# Patient Record
Sex: MALE | Race: BLACK OR AFRICAN AMERICAN | NOT HISPANIC OR LATINO | Employment: FULL TIME | URBAN - METROPOLITAN AREA
[De-identification: names, ages, dates, MRNs, and addresses within clinical notes are randomized per-mention and may not be internally consistent; named-entity substitution may affect disease eponyms.]

---

## 2017-09-29 ENCOUNTER — OFFICE VISIT (OUTPATIENT)
Dept: FAMILY MEDICINE | Facility: CLINIC | Age: 42
End: 2017-09-29
Payer: COMMERCIAL

## 2017-09-29 VITALS
WEIGHT: 270.63 LBS | DIASTOLIC BLOOD PRESSURE: 86 MMHG | TEMPERATURE: 99 F | SYSTOLIC BLOOD PRESSURE: 138 MMHG | HEART RATE: 97 BPM | BODY MASS INDEX: 42.48 KG/M2 | HEIGHT: 67 IN | OXYGEN SATURATION: 97 %

## 2017-09-29 DIAGNOSIS — L40.9 PSORIASIS: ICD-10-CM

## 2017-09-29 DIAGNOSIS — N40.0 ENLARGED PROSTATE: ICD-10-CM

## 2017-09-29 DIAGNOSIS — N45.1 EPIDIDYMITIS: Primary | ICD-10-CM

## 2017-09-29 PROCEDURE — 3008F BODY MASS INDEX DOCD: CPT | Mod: S$GLB,,, | Performed by: NURSE PRACTITIONER

## 2017-09-29 PROCEDURE — 99213 OFFICE O/P EST LOW 20 MIN: CPT | Mod: S$GLB,,, | Performed by: NURSE PRACTITIONER

## 2017-09-29 RX ORDER — FLUOCINONIDE 0.5 MG/G
OINTMENT TOPICAL 2 TIMES DAILY
Qty: 60 G | Refills: 0 | Status: SHIPPED | OUTPATIENT
Start: 2017-09-29 | End: 2017-11-01

## 2017-09-29 RX ORDER — CIPROFLOXACIN 500 MG/1
500 TABLET ORAL 2 TIMES DAILY
Qty: 60 TABLET | Refills: 0 | Status: SHIPPED | OUTPATIENT
Start: 2017-09-29 | End: 2017-10-09

## 2017-09-29 NOTE — PATIENT INSTRUCTIONS
Epididymitis  Inflammation of the epididymis can cause pain and swelling in your scrotum. The epididymis is a small tube next to the testicle that stores sperm. Epididymitis is usually caused by an infection. In sexually active men, it is often caused by a sexually transmitted disease (STD) such as chlamydia or gonorrhea. In boys and in men over 40, it can be from bacteria from other parts of the urinary tract (not an STD infection).  Symptoms may begin with pain in the lower belly (abdomen) or low back. The pain then spreads down into the scrotum. Usually only one side is affected. The testicle and scrotum swell and become very painful and red. You may have fever and a burning when passing urine. Sometimes you may have a discharge from the penis.  Treatment is with antibiotics, and anti-inflammatory and pain medicines. The condition should get better over the first few days of treatment. But it will take several weeks for all the swelling and discomfort to go away. If your healthcare provider suspects that an STD is the cause, your sexual partners may need to be treated.  Home care  The following will help you care for yourself at home:  · Support the scrotum. When lying down, place a rolled towel under the scrotum. When walking, use an athletic supporter or 2 pairs of jockey-style underwear.  · To relieve pain, put ice packs on the inflamed area. You can make your own ice pack by putting ice cubes in a sealed plastic bag wrapped in a thin towel.  · You may use over-the-counter medicines to control pain, unless another medicine was given. If you have chronic liver or kidney disease, talk with your healthcare provider before taking these medicines. Also talk with your provider if you've ever had a stomach ulcer or GI bleeding.  · Rest in bed for the first few days until the fever, pain, and swelling get better. It may take several weeks for all of the swelling to go away.  · Constipation can make you strain. This  makes the pain worse. Avoid constipation by eating natural laxatives such as prunes, fresh fruits, and whole-grain cereals. If necessary, use a mild over-the-counter laxative for constipation. Mineral oil can be used to keep the stools soft.  · Do not have sex until you have finished all treatment and all symptoms have cleared.  · Take all medicine as directed. Do not miss any doses and do not stop early even if you feel better.  Follow-up care  Follow up with your healthcare provider, or as advised, to be sure you are responding properly to treatment. If a culture was taken, you may call for the result as directed. A culture test can ensure that you are on the correct antibiotic.   When to seek medical advice  Call your healthcare provider right away if any of these occur:  · Fever of 100.4ºF (38ºC), or as directed by your healthcare provider  · Increasing pain or swelling of the testicle after starting treatment  · Pressure or pain in your bladder that gets worse  · Unable to pass urine for 8 hours  Date Last Reviewed: 9/1/2016  © 0678-3951 [employer]. [address] [place] [address], [place] [address]. All rights reserved. This information is not intended as a substitute for professional medical care. Always follow your healthcare professional's instructions.

## 2017-11-01 ENCOUNTER — OFFICE VISIT (OUTPATIENT)
Dept: FAMILY MEDICINE | Facility: CLINIC | Age: 42
End: 2017-11-01
Payer: COMMERCIAL

## 2017-11-01 VITALS
OXYGEN SATURATION: 98 % | WEIGHT: 270.38 LBS | BODY MASS INDEX: 42.44 KG/M2 | HEART RATE: 93 BPM | TEMPERATURE: 98 F | DIASTOLIC BLOOD PRESSURE: 92 MMHG | SYSTOLIC BLOOD PRESSURE: 138 MMHG | HEIGHT: 67 IN

## 2017-11-01 DIAGNOSIS — R68.89 FLU-LIKE SYMPTOMS: Primary | ICD-10-CM

## 2017-11-01 DIAGNOSIS — B34.9 VIRAL ILLNESS: ICD-10-CM

## 2017-11-01 LAB
CTP QC/QA: YES
FLUAV AG NPH QL: NEGATIVE
FLUBV AG NPH QL: NEGATIVE

## 2017-11-01 PROCEDURE — 99213 OFFICE O/P EST LOW 20 MIN: CPT | Mod: S$GLB,,, | Performed by: NURSE PRACTITIONER

## 2017-11-01 PROCEDURE — 87804 INFLUENZA ASSAY W/OPTIC: CPT | Mod: ,,, | Performed by: FAMILY MEDICINE

## 2017-11-01 NOTE — PROGRESS NOTES
Subjective:       Patient ID: Manjit Ramirez is a 42 y.o. male.    Chief Complaint: Generalized Body Aches    Sinus Problem   This is a new problem. The current episode started yesterday (wakes up with congestion). The problem is unchanged. There has been no fever. He is experiencing no pain. Pertinent negatives include no chills, congestion, coughing, diaphoresis, ear pain, headaches, hoarse voice, neck pain, shortness of breath, sinus pressure, sneezing, sore throat or swollen glands. Past treatments include acetaminophen.     Review of Systems   Constitutional: Negative for chills, diaphoresis, fatigue and fever.   HENT: Negative for congestion, ear pain, hoarse voice, postnasal drip, sinus pain, sinus pressure, sneezing and sore throat.         Only has congestion in the morning     Eyes: Negative for photophobia and visual disturbance.   Respiratory: Negative for cough, chest tightness, shortness of breath and wheezing.    Musculoskeletal: Positive for myalgias. Negative for neck pain.   Neurological: Negative for headaches.       Objective:      Physical Exam   Constitutional: He is oriented to person, place, and time. He appears well-developed and well-nourished. No distress.   HENT:   Right Ear: Tympanic membrane and external ear normal.   Left Ear: Tympanic membrane and external ear normal.   Nose: No mucosal edema or rhinorrhea. Right sinus exhibits no maxillary sinus tenderness and no frontal sinus tenderness. Left sinus exhibits no maxillary sinus tenderness and no frontal sinus tenderness.   Mouth/Throat: No oropharyngeal exudate, posterior oropharyngeal edema or posterior oropharyngeal erythema.   Cardiovascular: Normal rate, regular rhythm and normal heart sounds.    Pulmonary/Chest: Effort normal and breath sounds normal. No respiratory distress. He has no wheezes.   Neurological: He is alert and oriented to person, place, and time.   Skin: Skin is warm and dry. He is not diaphoretic.   Psychiatric:  He has a normal mood and affect. His behavior is normal. Judgment and thought content normal.   Vitals reviewed.      Assessment:       1. Flu-like symptoms    2. Viral illness        Plan:       Flu-like symptoms  -     POCT Influenza A/B    Viral illness    symptom management  Rest hydrate

## 2017-12-04 ENCOUNTER — OFFICE VISIT (OUTPATIENT)
Dept: FAMILY MEDICINE | Facility: CLINIC | Age: 42
End: 2017-12-04
Payer: COMMERCIAL

## 2017-12-04 VITALS
TEMPERATURE: 98 F | HEART RATE: 98 BPM | OXYGEN SATURATION: 98 % | BODY MASS INDEX: 42.16 KG/M2 | WEIGHT: 269.19 LBS | RESPIRATION RATE: 15 BRPM | SYSTOLIC BLOOD PRESSURE: 137 MMHG | DIASTOLIC BLOOD PRESSURE: 89 MMHG

## 2017-12-04 DIAGNOSIS — I10 ESSENTIAL HYPERTENSION: Primary | ICD-10-CM

## 2017-12-04 PROCEDURE — 99213 OFFICE O/P EST LOW 20 MIN: CPT | Mod: S$GLB,,, | Performed by: NURSE PRACTITIONER

## 2017-12-04 RX ORDER — AMLODIPINE BESYLATE 5 MG/1
5 TABLET ORAL DAILY
Qty: 30 TABLET | Refills: 0 | Status: SHIPPED | OUTPATIENT
Start: 2017-12-04 | End: 2017-12-20 | Stop reason: SDUPTHER

## 2017-12-04 NOTE — PROGRESS NOTES
Subjective:       Patient ID: Manjit Ramirez is a 42 y.o. male.    Chief Complaint: Hypertension    Hypertension   This is a new (patient went for [employer] physy[employer] today pressure was 164/100 they would not pass him) problem. The problem is unchanged. The problem is uncontrolled. Associated symptoms include headaches. Pertinent negatives include no anxiety, blurred vision, chest pain, malaise/fatigue, neck pain, orthopnea, palpitations, peripheral edema, PND, shortness of breath or sweats. Risk factors for coronary artery disease include male gender, obesity and sedentary lifestyle. Past treatments include nothing. There is no history of renovascular disease or a thyroid problem. There is no history of chronic renal disease, coarctation of the aorta, hyperaldosteronism, hypercortisolism, hyperparathyroidism, a hypertension causing med, pheochromocytoma or sleep apnea.     Review of Systems   Constitutional: Negative for chills, diaphoresis, fatigue, fever and malaise/fatigue.   Eyes: Negative for blurred vision, photophobia and visual disturbance.   Respiratory: Negative for chest tightness, shortness of breath and wheezing.    Cardiovascular: Negative for chest pain, palpitations, orthopnea, leg swelling and PND.   Musculoskeletal: Negative for neck pain.   Neurological: Positive for headaches. Negative for dizziness.       Objective:      Physical Exam   Constitutional: He is oriented to person, place, and time. He appears well-developed and well-nourished. No distress.   HENT:   Right Ear: External ear normal.   Left Ear: External ear normal.   Cardiovascular: Normal rate, regular rhythm and normal heart sounds.    Pulmonary/Chest: Effort normal and breath sounds normal.   Neurological: He is alert and oriented to person, place, and time.   Skin: Skin is warm and dry. He is not diaphoretic.   Psychiatric: He has a normal mood and affect. His behavior is normal. Judgment and thought content normal.   Vitals  reviewed.      Assessment:       1. Essential hypertension        Plan:       Essential hypertension  -     amLODIPine (NORVASC) 5 MG tablet; Take 1 tablet (5 mg total) by mouth once daily.  Dispense: 30 tablet; Refill: 0      Monitor pressure at home if higher the 140/90 increase medicine to 10mg  Decrease salt intake  Daily exercise

## 2017-12-08 ENCOUNTER — TELEPHONE (OUTPATIENT)
Dept: FAMILY MEDICINE | Facility: CLINIC | Age: 42
End: 2017-12-08

## 2017-12-08 NOTE — TELEPHONE ENCOUNTER
----- Message from Genie Kraus sent at 12/8/2017 11:51 AM CST -----  Patient says blood pressure still running high. Do you want to adjust dosage and/or change medication? Readings 146/92 (bottom nbr 90s) (top nbr 140-160)     Aruna

## 2017-12-20 ENCOUNTER — OFFICE VISIT (OUTPATIENT)
Dept: FAMILY MEDICINE | Facility: CLINIC | Age: 42
End: 2017-12-20
Payer: COMMERCIAL

## 2017-12-20 VITALS
OXYGEN SATURATION: 98 % | DIASTOLIC BLOOD PRESSURE: 98 MMHG | HEIGHT: 67 IN | BODY MASS INDEX: 41.87 KG/M2 | WEIGHT: 266.75 LBS | SYSTOLIC BLOOD PRESSURE: 152 MMHG | HEART RATE: 84 BPM | TEMPERATURE: 98 F

## 2017-12-20 DIAGNOSIS — I10 ESSENTIAL HYPERTENSION: ICD-10-CM

## 2017-12-20 DIAGNOSIS — Z00.00 ROUTINE HEALTH MAINTENANCE: Primary | ICD-10-CM

## 2017-12-20 PROCEDURE — 99396 PREV VISIT EST AGE 40-64: CPT | Mod: S$GLB,,, | Performed by: FAMILY MEDICINE

## 2017-12-20 RX ORDER — AMLODIPINE BESYLATE 10 MG/1
10 TABLET ORAL DAILY
Qty: 90 TABLET | Refills: 1 | Status: SHIPPED | OUTPATIENT
Start: 2017-12-20 | End: 2018-10-10 | Stop reason: SDUPTHER

## 2017-12-21 ENCOUNTER — LAB VISIT (OUTPATIENT)
Dept: LAB | Facility: HOSPITAL | Age: 42
End: 2017-12-21
Attending: FAMILY MEDICINE
Payer: COMMERCIAL

## 2017-12-21 DIAGNOSIS — I10 ESSENTIAL HYPERTENSION: ICD-10-CM

## 2017-12-21 DIAGNOSIS — Z00.00 ROUTINE HEALTH MAINTENANCE: ICD-10-CM

## 2017-12-21 LAB
ALBUMIN SERPL BCP-MCNC: 4.4 G/DL
ALP SERPL-CCNC: 79 U/L
ALT SERPL W/O P-5'-P-CCNC: 44 U/L
ANION GAP SERPL CALC-SCNC: 11 MMOL/L
AST SERPL-CCNC: 22 U/L
BASOPHILS # BLD AUTO: 0.04 K/UL
BASOPHILS NFR BLD: 0.6 %
BILIRUB SERPL-MCNC: 0.5 MG/DL
BUN SERPL-MCNC: 11 MG/DL
CALCIUM SERPL-MCNC: 9.1 MG/DL
CHLORIDE SERPL-SCNC: 101 MMOL/L
CHOLEST SERPL-MCNC: 211 MG/DL
CHOLEST/HDLC SERPL: 5.4 {RATIO}
CO2 SERPL-SCNC: 30 MMOL/L
CREAT SERPL-MCNC: 1.06 MG/DL
DIFFERENTIAL METHOD: NORMAL
EOSINOPHIL # BLD AUTO: 0.1 K/UL
EOSINOPHIL NFR BLD: 2.2 %
ERYTHROCYTE [DISTWIDTH] IN BLOOD BY AUTOMATED COUNT: 13.8 %
EST. GFR  (AFRICAN AMERICAN): >60 ML/MIN/1.73 M^2
EST. GFR  (NON AFRICAN AMERICAN): >60 ML/MIN/1.73 M^2
GLUCOSE SERPL-MCNC: 79 MG/DL
HCT VFR BLD AUTO: 44.6 %
HDLC SERPL-MCNC: 39 MG/DL
HDLC SERPL: 18.5 %
HGB BLD-MCNC: 14.8 G/DL
LDLC SERPL CALC-MCNC: 137.4 MG/DL
LYMPHOCYTES # BLD AUTO: 2.6 K/UL
LYMPHOCYTES NFR BLD: 40.6 %
MCH RBC QN AUTO: 28.8 PG
MCHC RBC AUTO-ENTMCNC: 33.2 G/DL
MCV RBC AUTO: 87 FL
MONOCYTES # BLD AUTO: 0.5 K/UL
MONOCYTES NFR BLD: 7.8 %
NEUTROPHILS # BLD AUTO: 3.2 K/UL
NEUTROPHILS NFR BLD: 48.8 %
NONHDLC SERPL-MCNC: 172 MG/DL
PLATELET # BLD AUTO: 235 K/UL
PMV BLD AUTO: 11.6 FL
POTASSIUM SERPL-SCNC: 4.1 MMOL/L
PROT SERPL-MCNC: 7.8 G/DL
RBC # BLD AUTO: 5.14 M/UL
SODIUM SERPL-SCNC: 142 MMOL/L
TRIGL SERPL-MCNC: 173 MG/DL
TSH SERPL DL<=0.005 MIU/L-ACNC: 0.99 UIU/ML
WBC # BLD AUTO: 6.5 K/UL

## 2017-12-21 PROCEDURE — 36415 COLL VENOUS BLD VENIPUNCTURE: CPT | Mod: PO

## 2017-12-21 PROCEDURE — 84443 ASSAY THYROID STIM HORMONE: CPT | Mod: PO

## 2017-12-21 PROCEDURE — 85025 COMPLETE CBC W/AUTO DIFF WBC: CPT | Mod: PO

## 2017-12-21 PROCEDURE — 80053 COMPREHEN METABOLIC PANEL: CPT | Mod: PO

## 2017-12-21 PROCEDURE — 80061 LIPID PANEL: CPT

## 2017-12-22 ENCOUNTER — TELEPHONE (OUTPATIENT)
Dept: FAMILY MEDICINE | Facility: CLINIC | Age: 42
End: 2017-12-22

## 2017-12-22 NOTE — TELEPHONE ENCOUNTER
----- Message from Manjit Alas MD sent at 12/21/2017 10:25 PM CST -----  Results are good  Your cholesterol is little high but no drastic changes.  Keep fats to a minimum.  Try to loose a little wt like 10 lbs over 2 mo and lets recheck in 3 mo    UA normal        Pt notified and advised that results are on his my [facility] if he wants to review them

## 2017-12-29 NOTE — PROGRESS NOTES
Patient ID: Manjit Ramirez is a 42 y.o. male.    Chief Complaint: Follow-up (blood pressure) and Blurred Vision    HPI      Manjit Ramirez is a 42 y.o. male. here for annual exam.   No current complaints.  Blurred vision when not connor glasses.  Just had an eye exam within 2 mo.        Review of Symptoms    Constitutional: Negative.    HENT: Negative.    Eyes: Negative.    Respiratory: Negative.    Cardiovascular: Negative.    Gastrointestinal: Negative.    Endocrine: Negative.    Genitourinary: Negative.    Musculoskeletal: Negative.    Skin: Negative.    Allergic/Immunologic: Negative.    Neurological: Negative.    Hematological: Negative.    Psychiatric/Behavioral: Negative.      Except as above in HPI        Physical  Exam    Constitutional:  Oriented to person, place, and time. Appears well-developed and well-nourished.     HENT:   Head: Normocephalic and atraumatic.     Right Ear: Tympanic membrane, external ear and ear canal normal.     Left Ear: Tympanic membrane, external ear and ear canal normal.     Nose: Nose normal. No rhinorrhea or nasal deformity.     Mouth/Throat: Uvula is midline, oropharynx is clear and moist and mucous membranes are normal.      Eyes: Conjunctivae are normal. Right eye exhibits no discharge. Left eye exhibits no discharge. No scleral icterus.     Neck:  No JVD present. No tracheal deviation  [x]  Neck supple.   []  No Carotid bruit    Cardiovascular: Normal rate, regular rhythm and normal heart sounds.      Pulmonary/Chest: Effort normal and breath sounds normal. No stridor. No respiratory distress. No wheezes. No rales.      Musculoskeletal: Normal range of motion. No edema or tenderness.   No deformity     Lymphadenopathy:  No cervical adenopathy.     Neurological:  Alert and oriented to person, place, and time. Coordination normal.     Skin: Skin is warm and dry. No rash noted.     Psychiatric: Normal mood and affect. Speech is normal and behavior is normal. Judgment and  thought content normal.     Complete Blood Count  Lab Results   Component Value Date    RBC 5.14 12/21/2017    HGB 14.8 12/21/2017    HCT 44.6 12/21/2017    MCV 87 12/21/2017    MCH 28.8 12/21/2017    MCHC 33.2 12/21/2017    RDW 13.8 12/21/2017     12/21/2017    MPV 11.6 12/21/2017    GRAN 3.2 12/21/2017    GRAN 48.8 12/21/2017    LYMPH 2.6 12/21/2017    LYMPH 40.6 12/21/2017    MONO 0.5 12/21/2017    MONO 7.8 12/21/2017    EOS 0.1 12/21/2017    BASO 0.04 12/21/2017    EOSINOPHIL 2.2 12/21/2017    BASOPHIL 0.6 12/21/2017    DIFFMETHOD Automated 12/21/2017       Comprehensive Metabolic Panel  Lab Results   Component Value Date    GLU 79 12/21/2017    BUN 11 12/21/2017    CREATININE 1.06 12/21/2017     12/21/2017    K 4.1 12/21/2017     12/21/2017    PROT 7.8 12/21/2017    ALBUMIN 4.4 12/21/2017    BILITOT 0.5 12/21/2017    AST 22 12/21/2017    ALKPHOS 79 12/21/2017    CO2 30 (H) 12/21/2017    ALT 44 12/21/2017    ANIONGAP 11 12/21/2017    EGFRNONAA >60.0 12/21/2017    ESTGFRAFRICA >60.0 12/21/2017       TSH  Lab Results   Component Value Date    TSH 0.995 12/21/2017       Assessment / Plan:      ICD-10-CM ICD-9-CM   1. Routine health maintenance Z00.00 V70.0   2. Essential hypertension I10 401.9     Routine health maintenance  -     Comprehensive metabolic panel; Future  -     CBC auto differential; Future  -     Lipid panel; Future  -     TSH; Future  -     Urinalysis; Future; Expected date: 12/20/2017    Essential hypertension  -     Comprehensive metabolic panel; Future  -     CBC auto differential; Future  -     Lipid panel; Future  -     TSH; Future  -     Urinalysis; Future; Expected date: 12/20/2017  -     amLODIPine (NORVASC) 10 MG tablet; Take 1 tablet (10 mg total) by mouth once daily.  Dispense: 90 tablet; Refill: 1          Discussed how to stay healthy including: diet, exercise, refraining from smoking and discussed screening exams / tests needed for age, sex and family Hx.

## 2018-02-12 ENCOUNTER — OFFICE VISIT (OUTPATIENT)
Dept: FAMILY MEDICINE | Facility: CLINIC | Age: 43
End: 2018-02-12
Payer: COMMERCIAL

## 2018-02-12 VITALS
WEIGHT: 265 LBS | OXYGEN SATURATION: 98 % | HEART RATE: 102 BPM | DIASTOLIC BLOOD PRESSURE: 82 MMHG | HEIGHT: 67 IN | BODY MASS INDEX: 41.59 KG/M2 | TEMPERATURE: 98 F | SYSTOLIC BLOOD PRESSURE: 126 MMHG

## 2018-02-12 DIAGNOSIS — I10 ESSENTIAL HYPERTENSION: Primary | ICD-10-CM

## 2018-02-12 PROCEDURE — 99213 OFFICE O/P EST LOW 20 MIN: CPT | Mod: S$GLB,,, | Performed by: FAMILY MEDICINE

## 2018-02-12 PROCEDURE — 3008F BODY MASS INDEX DOCD: CPT | Mod: S$GLB,,, | Performed by: FAMILY MEDICINE

## 2018-02-19 NOTE — PROGRESS NOTES
Patient ID: Manjit Ramirez is a 42 y.o. male.    Chief Complaint: Hypertension    HPI      Manjit Ramirez is a 42 y.o. male here following up on hypertension which is nearly controlled.  No symptoms at this time.  Tolerating medicine no recent dosing.            Review of Symptoms    Constitutional  Neg activity change, No chills /fever   Resp  Neg hemoptysis, stridor, choking  CVS  Neg chest pain, palpitations    Physical Exam    Constitutional:  Oriented to person, place, and time.appears well-developed and well-nourished.  No distress.      HENT  Head: Normocephalic and atraumatic  Right Ear: External ear normal.   Left Ear: External ear normal.   Nose: External nose normal.   Mouth:  Moist mucus membranes.    Eyes:  Conjunctivae are normal. Right eye exhibits no discharge.  Left eye exhibits no discharge. No scleral icterus.  No periorbital edema    Cardiovascular:  Regular rate, Regular rhythm     No extrasystole  Normal S1-S2      Pulmonary/Chest:   Normal effort and breath sounds.  No wheezing, rhonchi or rales.    Musculoskeletal:  No edema. No obvious deformity No wasting       Neurological:  Alert and oriented to person, place, and time.   Coordination normal.     Skin:   Skin is warm and dry.  No diaphoresis.   No rash noted.     Psychiatric: Normal mood and affect. Behavior is normal.  Judgment and thought content normal.       Assessment / Plan:      ICD-10-CM ICD-9-CM   1. Essential hypertension I10 401.9     Essential hypertension        Take medicine on a daily basis-

## 2018-10-10 ENCOUNTER — OFFICE VISIT (OUTPATIENT)
Dept: FAMILY MEDICINE | Facility: CLINIC | Age: 43
End: 2018-10-10
Payer: COMMERCIAL

## 2018-10-10 VITALS
HEIGHT: 64 IN | WEIGHT: 262.44 LBS | HEART RATE: 90 BPM | OXYGEN SATURATION: 96 % | TEMPERATURE: 99 F | BODY MASS INDEX: 44.8 KG/M2 | SYSTOLIC BLOOD PRESSURE: 146 MMHG | DIASTOLIC BLOOD PRESSURE: 90 MMHG

## 2018-10-10 DIAGNOSIS — I10 ESSENTIAL HYPERTENSION: ICD-10-CM

## 2018-10-10 DIAGNOSIS — Z00.00 ROUTINE HEALTH MAINTENANCE: Primary | ICD-10-CM

## 2018-10-10 PROCEDURE — 3077F SYST BP >= 140 MM HG: CPT | Mod: CPTII,S$GLB,, | Performed by: FAMILY MEDICINE

## 2018-10-10 PROCEDURE — 99396 PREV VISIT EST AGE 40-64: CPT | Mod: S$GLB,,, | Performed by: FAMILY MEDICINE

## 2018-10-10 PROCEDURE — 3080F DIAST BP >= 90 MM HG: CPT | Mod: CPTII,S$GLB,, | Performed by: FAMILY MEDICINE

## 2018-10-10 RX ORDER — AMLODIPINE BESYLATE 10 MG/1
10 TABLET ORAL DAILY
Qty: 90 TABLET | Refills: 1 | Status: SHIPPED | OUTPATIENT
Start: 2018-10-10 | End: 2019-04-03 | Stop reason: SDUPTHER

## 2018-10-14 NOTE — PROGRESS NOTES
" Patient ID: Manjit Ramirez is a 43 y.o. male.    Chief Complaint: Annual Exam (wellness) and calf pain left back of leg    HPI      Manjit Ramirez is a 43 y.o. male. here for annual exam.   No current complaints.  Complaints of mild intermittent pain on left calf periodically.  Often happens at night with spasm.        Review of Symptoms    Constitutional: Negative.    HENT: Negative.    Eyes: Negative.    Respiratory: Negative.    Cardiovascular: Negative.    Gastrointestinal: Negative.    Endocrine: Negative.    Genitourinary: Negative.    Musculoskeletal: Negative.    Skin: Negative.    Allergic/Immunologic: Negative.    Neurological: Negative.    Hematological: Negative.    Psychiatric/Behavioral: Negative.      Except as above in HPI      BP (!) 146/90   Pulse 90   Temp 98.5 °F (36.9 °C)   Ht 5' 4" (1.626 m)   Wt 119 kg (262 lb 7.3 oz)   SpO2 96%   BMI 45.05 kg/m²     Physical  Exam    Constitutional:  Oriented to person, place, and time. Appears well-developed and well-nourished.     HENT:   Head: Normocephalic and atraumatic.     Right Ear: Tympanic membrane, ear canal and External ear normal     Left Ear: Tympanic membrane, ear canal and External ear normal     Nose: Nose normal. No rhinorrhea or nasal deformity.     Mouth/Throat: Uvula is midline, oropharynx is clear and moist and mucous membranes are normal.      Eyes: Conjunctivae are normal. Right eye exhibits no discharge. Left eye exhibits no discharge. No scleral icterus.     Neck:  No JVD present. No tracheal deviation  []  Neck supple.   []  No Carotid bruit    Cardiovascular:  Regular rate and rhythm with normal S1 and S2     Pulmonary/Chest:   Clear to auscultation bilaterally without wheezes, rhonchi or rales    Musculoskeletal: Normal range of motion. No edema or tenderness.   No deformity     Lymphadenopathy:  No cervical adenopathy.     Neurological:  Alert and oriented to person, place, and time. Coordination normal.     Skin: Skin is " warm and dry. No rash noted.     Psychiatric: Normal mood and affect. Speech is normal and behavior is normal. Judgment and thought content normal.     Complete Blood Count  Lab Results   Component Value Date    RBC 5.14 12/21/2017    HGB 14.8 12/21/2017    HCT 44.6 12/21/2017    MCV 87 12/21/2017    MCH 28.8 12/21/2017    MCHC 33.2 12/21/2017    RDW 13.8 12/21/2017     12/21/2017    MPV 11.6 12/21/2017    GRAN 3.2 12/21/2017    GRAN 48.8 12/21/2017    LYMPH 2.6 12/21/2017    LYMPH 40.6 12/21/2017    MONO 0.5 12/21/2017    MONO 7.8 12/21/2017    EOS 0.1 12/21/2017    BASO 0.04 12/21/2017    EOSINOPHIL 2.2 12/21/2017    BASOPHIL 0.6 12/21/2017    DIFFMETHOD Automated 12/21/2017       Comprehensive Metabolic Panel  No results found for: GLU, BUN, CREATININE, NA, K, CL, PROT, ALBUMIN, BILITOT, AST, ALKPHOS, CO2, ALT, ANIONGAP, EGFRNONAA, ESTGFRAFRICA    TSH  No results found for: TSH    Assessment / Plan:      ICD-10-CM ICD-9-CM   1. Routine health maintenance Z00.00 V70.0   2. Essential hypertension I10 401.9     Routine health maintenance  -     Comprehensive metabolic panel; Future  -     CBC auto differential; Future  -     Lipid panel; Future  -     TSH; Future    Essential hypertension  -     Comprehensive metabolic panel; Future  -     CBC auto differential; Future  -     Lipid panel; Future  -     TSH; Future  -     amLODIPine (NORVASC) 10 MG tablet; Take 1 tablet (10 mg total) by mouth once daily.  Dispense: 90 tablet; Refill: 1      Hypertension-discussed frequent visits to get under control-  Calf pain-just stretching and good hydration    Discussed how to stay healthy including: diet, exercise, refraining from smoking and discussed screening exams / tests needed for age, sex and family Hx.

## 2018-10-17 ENCOUNTER — LAB VISIT (OUTPATIENT)
Dept: LAB | Facility: HOSPITAL | Age: 43
End: 2018-10-17
Attending: FAMILY MEDICINE
Payer: COMMERCIAL

## 2018-10-17 ENCOUNTER — TELEPHONE (OUTPATIENT)
Dept: FAMILY MEDICINE | Facility: CLINIC | Age: 43
End: 2018-10-17

## 2018-10-17 DIAGNOSIS — I10 ESSENTIAL HYPERTENSION: ICD-10-CM

## 2018-10-17 DIAGNOSIS — Z00.00 ROUTINE HEALTH MAINTENANCE: ICD-10-CM

## 2018-10-17 LAB
ALBUMIN SERPL BCP-MCNC: 4.3 G/DL
ALP SERPL-CCNC: 79 U/L
ALT SERPL W/O P-5'-P-CCNC: 28 U/L
ANION GAP SERPL CALC-SCNC: 8 MMOL/L
AST SERPL-CCNC: 19 U/L
BASOPHILS # BLD AUTO: 0.03 K/UL
BASOPHILS NFR BLD: 0.5 %
BILIRUB SERPL-MCNC: 0.4 MG/DL
BUN SERPL-MCNC: 15 MG/DL
CALCIUM SERPL-MCNC: 9 MG/DL
CHLORIDE SERPL-SCNC: 104 MMOL/L
CHOLEST SERPL-MCNC: 176 MG/DL
CHOLEST/HDLC SERPL: 4.8 {RATIO}
CO2 SERPL-SCNC: 28 MMOL/L
CREAT SERPL-MCNC: 0.98 MG/DL
DIFFERENTIAL METHOD: NORMAL
EOSINOPHIL # BLD AUTO: 0.1 K/UL
EOSINOPHIL NFR BLD: 2 %
ERYTHROCYTE [DISTWIDTH] IN BLOOD BY AUTOMATED COUNT: 13.8 %
EST. GFR  (AFRICAN AMERICAN): >60 ML/MIN/1.73 M^2
EST. GFR  (NON AFRICAN AMERICAN): >60 ML/MIN/1.73 M^2
GLUCOSE SERPL-MCNC: 95 MG/DL
HCT VFR BLD AUTO: 45.2 %
HDLC SERPL-MCNC: 37 MG/DL
HDLC SERPL: 21 %
HGB BLD-MCNC: 15 G/DL
LDLC SERPL CALC-MCNC: 123.2 MG/DL
LYMPHOCYTES # BLD AUTO: 2.4 K/UL
LYMPHOCYTES NFR BLD: 38.7 %
MCH RBC QN AUTO: 28.4 PG
MCHC RBC AUTO-ENTMCNC: 33.2 G/DL
MCV RBC AUTO: 85 FL
MONOCYTES # BLD AUTO: 0.5 K/UL
MONOCYTES NFR BLD: 8.9 %
NEUTROPHILS # BLD AUTO: 3 K/UL
NEUTROPHILS NFR BLD: 49.7 %
NONHDLC SERPL-MCNC: 139 MG/DL
PLATELET # BLD AUTO: 198 K/UL
PMV BLD AUTO: 12.1 FL
POTASSIUM SERPL-SCNC: 3.8 MMOL/L
PROT SERPL-MCNC: 7.6 G/DL
RBC # BLD AUTO: 5.29 M/UL
SODIUM SERPL-SCNC: 140 MMOL/L
TRIGL SERPL-MCNC: 79 MG/DL
TSH SERPL DL<=0.005 MIU/L-ACNC: 1.13 UIU/ML
WBC # BLD AUTO: 6.08 K/UL

## 2018-10-17 PROCEDURE — 84443 ASSAY THYROID STIM HORMONE: CPT | Mod: PO

## 2018-10-17 PROCEDURE — 80053 COMPREHEN METABOLIC PANEL: CPT | Mod: PO

## 2018-10-17 PROCEDURE — 80061 LIPID PANEL: CPT

## 2018-10-17 PROCEDURE — 85025 COMPLETE CBC W/AUTO DIFF WBC: CPT | Mod: PO

## 2018-10-17 PROCEDURE — 36415 COLL VENOUS BLD VENIPUNCTURE: CPT | Mod: PO

## 2018-10-17 NOTE — TELEPHONE ENCOUNTER
----- Message from Manjit Alas MD sent at 10/17/2018 12:35 PM CDT -----  Results are normal  Lipid/ cholesterol not back yet    Note - Lipid has resulted - normal results  I called and notified the pt

## 2018-11-27 ENCOUNTER — TELEPHONE (OUTPATIENT)
Dept: UROLOGY | Facility: CLINIC | Age: 43
End: 2018-11-27

## 2018-11-27 NOTE — TELEPHONE ENCOUNTER
----- Message from Mary Ellen Rodriguez sent at 11/27/2018 10:06 AM CST -----  Contact: [phone]/self  Please call pt to schedule appt for prostate. Pt would like to be seen in 12/2018.

## 2019-01-08 ENCOUNTER — TELEPHONE (OUTPATIENT)
Dept: FAMILY MEDICINE | Facility: CLINIC | Age: 44
End: 2019-01-08

## 2019-01-08 NOTE — TELEPHONE ENCOUNTER
----- Message from Marjorie Elias sent at 1/8/2019 10:45 AM CST -----  Contact: Self [phone]  Patient is requesting to be seen tomorrow in regards to pain on his left knee.

## 2019-01-09 ENCOUNTER — HOSPITAL ENCOUNTER (OUTPATIENT)
Dept: RADIOLOGY | Facility: HOSPITAL | Age: 44
Discharge: HOME | End: 2019-01-09
Attending: FAMILY MEDICINE
Payer: COMMERCIAL

## 2019-01-09 ENCOUNTER — OFFICE VISIT (OUTPATIENT)
Dept: FAMILY MEDICINE | Facility: CLINIC | Age: 44
End: 2019-01-09
Payer: COMMERCIAL

## 2019-01-09 VITALS
HEIGHT: 64 IN | WEIGHT: 267.44 LBS | HEART RATE: 95 BPM | DIASTOLIC BLOOD PRESSURE: 90 MMHG | OXYGEN SATURATION: 95 % | BODY MASS INDEX: 45.66 KG/M2 | SYSTOLIC BLOOD PRESSURE: 120 MMHG | TEMPERATURE: 98 F

## 2019-01-09 DIAGNOSIS — M25.569 KNEE PAIN, UNSPECIFIED CHRONICITY, UNSPECIFIED LATERALITY: ICD-10-CM

## 2019-01-09 DIAGNOSIS — Z23 NEED FOR INFLUENZA VACCINATION: ICD-10-CM

## 2019-01-09 DIAGNOSIS — M25.569 KNEE PAIN, UNSPECIFIED CHRONICITY, UNSPECIFIED LATERALITY: Primary | ICD-10-CM

## 2019-01-09 PROCEDURE — 90471 IMMUNIZATION ADMIN: CPT | Mod: S$GLB,,, | Performed by: FAMILY MEDICINE

## 2019-01-09 PROCEDURE — 90471 FLU VACCINE (QUAD) GREATER THAN OR EQUAL TO 3YO PRESERVATIVE FREE IM: ICD-10-PCS | Mod: S$GLB,,, | Performed by: FAMILY MEDICINE

## 2019-01-09 PROCEDURE — 99213 OFFICE O/P EST LOW 20 MIN: CPT | Mod: 25,S$GLB,, | Performed by: FAMILY MEDICINE

## 2019-01-09 PROCEDURE — 90686 IIV4 VACC NO PRSV 0.5 ML IM: CPT | Mod: S$GLB,,, | Performed by: FAMILY MEDICINE

## 2019-01-09 PROCEDURE — 99213 PR OFFICE/OUTPT VISIT, EST, LEVL III, 20-29 MIN: ICD-10-PCS | Mod: 25,S$GLB,, | Performed by: FAMILY MEDICINE

## 2019-01-09 PROCEDURE — 3074F SYST BP LT 130 MM HG: CPT | Mod: CPTII,S$GLB,, | Performed by: FAMILY MEDICINE

## 2019-01-09 PROCEDURE — 90686 FLU VACCINE (QUAD) GREATER THAN OR EQUAL TO 3YO PRESERVATIVE FREE IM: ICD-10-PCS | Mod: S$GLB,,, | Performed by: FAMILY MEDICINE

## 2019-01-09 PROCEDURE — 3080F PR MOST RECENT DIASTOLIC BLOOD PRESSURE >= 90 MM HG: ICD-10-PCS | Mod: CPTII,S$GLB,, | Performed by: FAMILY MEDICINE

## 2019-01-09 PROCEDURE — 73560 X-RAY EXAM OF KNEE 1 OR 2: CPT | Mod: TC,FY,PO,LT,ER

## 2019-01-09 PROCEDURE — 3008F BODY MASS INDEX DOCD: CPT | Mod: CPTII,S$GLB,, | Performed by: FAMILY MEDICINE

## 2019-01-09 PROCEDURE — 3080F DIAST BP >= 90 MM HG: CPT | Mod: CPTII,S$GLB,, | Performed by: FAMILY MEDICINE

## 2019-01-09 PROCEDURE — 3008F PR BODY MASS INDEX (BMI) DOCUMENTED: ICD-10-PCS | Mod: CPTII,S$GLB,, | Performed by: FAMILY MEDICINE

## 2019-01-09 PROCEDURE — 3074F PR MOST RECENT SYSTOLIC BLOOD PRESSURE < 130 MM HG: ICD-10-PCS | Mod: CPTII,S$GLB,, | Performed by: FAMILY MEDICINE

## 2019-01-09 NOTE — PROGRESS NOTES
Subjective:       Patient ID: Manjit Ramirez is a 43 y.o. male.    Chief Complaint:   Chief Complaint   Patient presents with    Knee Pain     lt knee pain        Knee Pain    The incident occurred more than 1 week ago. There was no injury mechanism. The pain is present in the left knee (Was having calf pain about 2 weeks ago.  That has resolved, but mc medial knee hurts. ). The pain has been fluctuating (Worse when kneeling or climbing stairs. ) since onset. Associated symptoms include a loss of motion. Pertinent negatives include no inability to bear weight, loss of sensation, muscle weakness, numbness or tingling. Nothing aggravates the symptoms.     Review of Systems   Constitutional: Negative for activity change, appetite change, chills and fatigue.   HENT: Negative for congestion, ear discharge, hearing loss, mouth sores, rhinorrhea, sinus pain and trouble swallowing.    Eyes: Negative for photophobia, pain, discharge and visual disturbance.   Respiratory: Negative for cough, chest tightness, shortness of breath and wheezing.    Cardiovascular: Negative for chest pain, palpitations and leg swelling.   Gastrointestinal: Negative for abdominal distention, abdominal pain, blood in stool, constipation, diarrhea, nausea and vomiting.   Genitourinary: Negative for difficulty urinating, dysuria and flank pain.   Musculoskeletal: Negative for arthralgias, back pain, gait problem, joint swelling and myalgias.   Skin: Negative for color change and rash.   Neurological: Negative for dizziness, tingling, tremors, speech difficulty, light-headedness, numbness and headaches.   Psychiatric/Behavioral: Negative for behavioral problems, confusion, hallucinations, sleep disturbance and suicidal ideas.       Past Medical History:   Diagnosis Date    Hypertension      Social History     Socioeconomic History    Marital status:      Spouse name: Not on file    Number of children: Not on file    Years of education: Not  "on file    Highest education level: Not on file   Social Needs    Financial resource strain: Not on file    Food insecurity - worry: Not on file    Food insecurity - inability: Not on file    Transportation needs - medical: Not on file    Transportation needs - non-medical: Not on file   Occupational History    Not on file   Tobacco Use    Smoking status: Never Smoker    Smokeless tobacco: Never Used   Substance and Sexual Activity    Alcohol use: No    Drug use: Not on file    Sexual activity: Not on file   Other Topics Concern    Not on file   Social History Narrative    Not on file       Objective:     BP (!) 120/90 (BP Location: Left arm, Patient Position: Sitting)   Pulse 95   Temp 98.1 °F (36.7 °C) (Oral)   Ht 5' 4" (1.626 m)   Wt 121.3 kg (267 lb 6.7 oz)   SpO2 95%   BMI 45.90 kg/m²     Physical Exam   Constitutional: He appears well-developed.   HENT:   Head: Normocephalic.   Eyes: Conjunctivae are normal.   Neck: Normal range of motion. Neck supple.   Cardiovascular: Normal rate and regular rhythm.   Pulmonary/Chest: Effort normal and breath sounds normal.   Musculoskeletal:        Left knee: He exhibits normal range of motion, no swelling, no effusion, no ecchymosis, no deformity and no erythema. Tenderness found. Medial joint line tenderness noted.   Neurological: He is alert.   Skin: Skin is warm.   Psychiatric: He has a normal mood and affect.   Nursing note and vitals reviewed.      Assessment:       1. Knee pain, unspecified chronicity, unspecified laterality        Plan:       Knee pain, unspecified chronicity, unspecified laterality  -     X-Ray Knee 1 or 2 View Left; Future; Expected date: 01/09/2019    Need for influenza vaccination  -     Influenza - Quadrivalent (3 years & older) (PF)        "

## 2019-01-10 ENCOUNTER — PATIENT MESSAGE (OUTPATIENT)
Dept: FAMILY MEDICINE | Facility: CLINIC | Age: 44
End: 2019-01-10

## 2019-04-03 ENCOUNTER — OFFICE VISIT (OUTPATIENT)
Dept: FAMILY MEDICINE | Facility: CLINIC | Age: 44
End: 2019-04-03
Payer: COMMERCIAL

## 2019-04-03 VITALS
TEMPERATURE: 98 F | DIASTOLIC BLOOD PRESSURE: 78 MMHG | OXYGEN SATURATION: 97 % | HEIGHT: 64 IN | HEART RATE: 101 BPM | BODY MASS INDEX: 46.33 KG/M2 | SYSTOLIC BLOOD PRESSURE: 138 MMHG | WEIGHT: 271.38 LBS

## 2019-04-03 DIAGNOSIS — I10 ESSENTIAL HYPERTENSION: ICD-10-CM

## 2019-04-03 DIAGNOSIS — Z00.00 WELLNESS EXAMINATION: ICD-10-CM

## 2019-04-03 DIAGNOSIS — A35 TETANUS: Primary | ICD-10-CM

## 2019-04-03 PROCEDURE — 3075F PR MOST RECENT SYSTOLIC BLOOD PRESS GE 130-139MM HG: ICD-10-PCS | Mod: CPTII,S$GLB,, | Performed by: FAMILY MEDICINE

## 2019-04-03 PROCEDURE — 90715 TDAP VACCINE GREATER THAN OR EQUAL TO 7YO IM: ICD-10-PCS | Mod: S$GLB,,, | Performed by: FAMILY MEDICINE

## 2019-04-03 PROCEDURE — 90471 TDAP VACCINE GREATER THAN OR EQUAL TO 7YO IM: ICD-10-PCS | Mod: S$GLB,,, | Performed by: FAMILY MEDICINE

## 2019-04-03 PROCEDURE — 3078F PR MOST RECENT DIASTOLIC BLOOD PRESSURE < 80 MM HG: ICD-10-PCS | Mod: CPTII,S$GLB,, | Performed by: FAMILY MEDICINE

## 2019-04-03 PROCEDURE — 99396 PR PREVENTIVE VISIT,EST,40-64: ICD-10-PCS | Mod: 25,S$GLB,, | Performed by: FAMILY MEDICINE

## 2019-04-03 PROCEDURE — 99396 PREV VISIT EST AGE 40-64: CPT | Mod: 25,S$GLB,, | Performed by: FAMILY MEDICINE

## 2019-04-03 PROCEDURE — 90715 TDAP VACCINE 7 YRS/> IM: CPT | Mod: S$GLB,,, | Performed by: FAMILY MEDICINE

## 2019-04-03 PROCEDURE — 3078F DIAST BP <80 MM HG: CPT | Mod: CPTII,S$GLB,, | Performed by: FAMILY MEDICINE

## 2019-04-03 PROCEDURE — 3075F SYST BP GE 130 - 139MM HG: CPT | Mod: CPTII,S$GLB,, | Performed by: FAMILY MEDICINE

## 2019-04-03 PROCEDURE — 90471 IMMUNIZATION ADMIN: CPT | Mod: S$GLB,,, | Performed by: FAMILY MEDICINE

## 2019-04-03 RX ORDER — AMLODIPINE BESYLATE 10 MG/1
TABLET ORAL
Qty: 90 TABLET | Refills: 1 | Status: SHIPPED | OUTPATIENT
Start: 2019-04-03 | End: 2019-08-17 | Stop reason: ALTCHOICE

## 2019-04-03 NOTE — PROGRESS NOTES
Subjective:       Patient ID: Manjit Ramirez is a 43 y.o. male.    Chief Complaint:   Chief Complaint   Patient presents with    Medication Refill       Patient presents for a wellness exam.  He has no complaints or concerns today.     Review of Systems   Constitutional: Negative for activity change, appetite change, chills and fatigue.   HENT: Negative for congestion, ear discharge, hearing loss, mouth sores, rhinorrhea, sinus pain and trouble swallowing.    Eyes: Negative for photophobia, pain, discharge and visual disturbance.   Respiratory: Negative for cough, chest tightness and wheezing.    Cardiovascular: Negative for leg swelling.   Gastrointestinal: Negative for abdominal distention, abdominal pain, blood in stool, constipation, diarrhea, nausea and vomiting.   Genitourinary: Negative for difficulty urinating, dysuria and flank pain.   Musculoskeletal: Negative for arthralgias, back pain, gait problem, joint swelling and myalgias.   Skin: Negative for color change and rash.   Neurological: Negative for dizziness, tremors, speech difficulty, light-headedness and numbness.   Psychiatric/Behavioral: Negative for behavioral problems, confusion, hallucinations, sleep disturbance and suicidal ideas.       Past Medical History:   Diagnosis Date    Hypertension      Social History     Socioeconomic History    Marital status:      Spouse name: Not on file    Number of children: Not on file    Years of education: Not on file    Highest education level: Not on file   Occupational History    Not on file   Social Needs    Financial resource strain: Not on file    Food insecurity:     Worry: Not on file     Inability: Not on file    Transportation needs:     Medical: Not on file     Non-medical: Not on file   Tobacco Use    Smoking status: Never Smoker    Smokeless tobacco: Never Used   Substance and Sexual Activity    Alcohol use: No    Drug use: Not on file    Sexual activity: Not on file  "  Lifestyle    Physical activity:     Days per week: Not on file     Minutes per session: Not on file    Stress: Not on file   Relationships    Social connections:     Talks on phone: Not on file     Gets together: Not on file     Attends Jewish service: Not on file     Active member of club or organization: Not on file     Attends meetings of clubs or organizations: Not on file     Relationship status: Not on file   Other Topics Concern    Not on file   Social History Narrative    Not on file     Family History   Problem Relation Age of Onset    Stroke Father          Objective:     /78 (BP Location: Right arm, Patient Position: Sitting)   Pulse 101   Temp 98.2 °F (36.8 °C) (Oral)   Ht 5' 4" (1.626 m)   Wt 123.1 kg (271 lb 6.2 oz)   SpO2 97%   BMI 46.58 kg/m²     Physical Exam   Constitutional: He is oriented to person, place, and time. He appears well-developed. No distress.   HENT:   Head: Normocephalic.   Right Ear: External ear normal.   Left Ear: External ear normal.   Mouth/Throat: No oropharyngeal exudate.   Eyes: Conjunctivae are normal. Right eye exhibits no discharge. Left eye exhibits no discharge.   Neck: Normal range of motion. Neck supple. No thyromegaly present.   Cardiovascular: Normal rate and regular rhythm. Exam reveals no friction rub.   No murmur heard.  Pulmonary/Chest: Effort normal and breath sounds normal. No stridor. No respiratory distress. He has no wheezes.   Abdominal: Soft. Bowel sounds are normal. He exhibits no distension. There is no tenderness. There is no guarding.   Musculoskeletal: Normal range of motion. He exhibits no edema or deformity.   Neurological: He is alert and oriented to person, place, and time. No cranial nerve deficit. Coordination normal.   Skin: Skin is warm. No rash noted. He is not diaphoretic. No erythema. No pallor.   Psychiatric: He has a normal mood and affect. Thought content normal.   Nursing note and vitals reviewed.    "   Assessment:       Tetanus  -     (In Office Administered) Tdap Vaccine    Wellness examination  -     Lipid panel; Future; Expected date: 04/03/2019  -     Glucose, fasting; Future; Expected date: 04/03/2019  -     PSA, Screening; Future; Expected date: 04/03/2019

## 2019-07-31 NOTE — PROGRESS NOTES
Detail Level: Zone Subjective:       Patient ID: Manjit Ramirez is a 42 y.o. male.    Chief Complaint: Groin Pain    Groin Pain   The patient's primary symptoms include testicular pain. The patient's pertinent negatives include no genital injury, genital itching, genital lesions, pelvic pain, penile discharge, penile pain, priapism or scrotal swelling. This is a new problem. The current episode started in the past 7 days (3 days). The problem occurs intermittently. The problem has been unchanged. The pain is mild. Associated symptoms include a rash. Pertinent negatives include no abdominal pain, anorexia, chest pain, chills, constipation, coughing, diarrhea, discolored urine, dysuria, fever, flank pain, frequency, headaches, hematuria, hesitancy, joint pain, joint swelling, nausea, painful intercourse, shortness of breath, sore throat, urgency, urinary retention or vomiting. The testicular pain affects the left testicle. The color of the testicles is normal. Nothing aggravates the symptoms. He has tried nothing for the symptoms. He is sexually active. It is unknown whether or not his partner has an STD. There is no history of BPH, chlamydia, cryptorchidism, erectile aid use, erectile dysfunction, a femoral hernia, gonorrhea, herpes simplex, HIV, an inguinal hernia, kidney stones, prostatitis, sickle cell disease, syphilis or varicocele.     Review of Systems   Constitutional: Negative for chills and fever.   HENT: Negative for sore throat.    Respiratory: Negative for cough and shortness of breath.    Cardiovascular: Negative for chest pain.   Gastrointestinal: Negative for abdominal pain, anorexia, constipation, diarrhea, nausea and vomiting.   Genitourinary: Positive for testicular pain. Negative for discharge, dysuria, flank pain, frequency, hesitancy, pelvic pain, penile pain, penile swelling, scrotal swelling and urgency.   Musculoskeletal: Negative for joint pain.   Skin: Positive for rash.        Bilateral forearms has been  Quality 131: Pain Assessment And Follow-Up: Pain assessment documented as positive, follow-up plan not documented, documentation the patient is not eligible there for a couple of months     Neurological: Negative for headaches.       Objective:      Physical Exam   Constitutional: He is oriented to person, place, and time. He appears well-developed and well-nourished. No distress.   Cardiovascular: Normal rate and regular rhythm.    Pulmonary/Chest: Effort normal and breath sounds normal.   Genitourinary: Right testis shows no mass, no swelling and no tenderness. Left testis shows tenderness. Left testis shows no mass and no swelling.   Genitourinary Comments: + Prehn's sign     Neurological: He is alert and oriented to person, place, and time.   Skin: Skin is warm and dry. Rash noted. He is not diaphoretic. No erythema. No pallor.        Dry scaly rash noted to bilateral areas    Psychiatric: He has a normal mood and affect. His behavior is normal. Judgment and thought content normal.   Vitals reviewed.      Assessment:       1. Epididymitis    2. Enlarged prostate    3. Psoriasis        Plan:       Epididymitis  -     ciprofloxacin HCl (CIPRO) 500 MG tablet; Take 1 tablet (500 mg total) by mouth 2 (two) times daily.  Dispense: 60 tablet; Refill: 0    Enlarged prostate    Psoriasis    Other orders  -     fluocinonide (LIDEX) 0.05 % ointment; Apply topically 2 (two) times daily.  Dispense: 60 g; Refill: 0

## 2019-08-16 ENCOUNTER — TELEPHONE (OUTPATIENT)
Dept: FAMILY MEDICINE | Facility: CLINIC | Age: 44
End: 2019-08-16

## 2019-08-16 NOTE — TELEPHONE ENCOUNTER
Chelsea RAMOS [facility] Staff   Caller: [phone] (Today,  1:42 PM)             Type:  Same Day Appointment Request     Caller is requesting a same day appointment.  Caller declined first available appointment listed below.     Name of Caller:Pt   When is the first available appointment?   Symptoms: abnormalities in EKG after physical   Best Call Back Number:[phone]   Additional Information:

## 2019-08-16 NOTE — TELEPHONE ENCOUNTER
rtn pt call and the call dropped  I tried to rtn call - I left voice mail    I have scheduled him to see dr ceballos tomorrow at 1120  I advised the pt to rtn call to confirm this

## 2019-08-16 NOTE — TELEPHONE ENCOUNTER
----- Message from Jes Allen sent at 8/16/2019  1:14 PM CDT -----  Contact: self / [phone]  Patient is requesting a call back regarding, a test he took is abnormal. Please advise       I don't see any recent test in epic, I called the pt no answer and unable to LM

## 2019-08-17 ENCOUNTER — OFFICE VISIT (OUTPATIENT)
Dept: FAMILY MEDICINE | Facility: CLINIC | Age: 44
End: 2019-08-17
Payer: COMMERCIAL

## 2019-08-17 VITALS
OXYGEN SATURATION: 96 % | SYSTOLIC BLOOD PRESSURE: 136 MMHG | BODY MASS INDEX: 45.96 KG/M2 | TEMPERATURE: 98 F | HEIGHT: 64 IN | WEIGHT: 269.19 LBS | DIASTOLIC BLOOD PRESSURE: 92 MMHG | HEART RATE: 100 BPM

## 2019-08-17 DIAGNOSIS — R94.31 ABNORMAL EKG: Primary | ICD-10-CM

## 2019-08-17 PROCEDURE — 99213 OFFICE O/P EST LOW 20 MIN: CPT | Mod: S$GLB,,, | Performed by: FAMILY MEDICINE

## 2019-08-17 PROCEDURE — 93010 EKG 12-LEAD: ICD-10-PCS | Mod: S$GLB,,, | Performed by: INTERNAL MEDICINE

## 2019-08-17 PROCEDURE — 93005 ELECTROCARDIOGRAM TRACING: CPT | Mod: S$GLB,,, | Performed by: FAMILY MEDICINE

## 2019-08-17 PROCEDURE — 93005 EKG 12-LEAD: ICD-10-PCS | Mod: S$GLB,,, | Performed by: FAMILY MEDICINE

## 2019-08-17 PROCEDURE — 3075F SYST BP GE 130 - 139MM HG: CPT | Mod: CPTII,S$GLB,, | Performed by: FAMILY MEDICINE

## 2019-08-17 PROCEDURE — 99213 PR OFFICE/OUTPT VISIT, EST, LEVL III, 20-29 MIN: ICD-10-PCS | Mod: S$GLB,,, | Performed by: FAMILY MEDICINE

## 2019-08-17 PROCEDURE — 3080F DIAST BP >= 90 MM HG: CPT | Mod: CPTII,S$GLB,, | Performed by: FAMILY MEDICINE

## 2019-08-17 PROCEDURE — 3008F PR BODY MASS INDEX (BMI) DOCUMENTED: ICD-10-PCS | Mod: CPTII,S$GLB,, | Performed by: FAMILY MEDICINE

## 2019-08-17 PROCEDURE — 3008F BODY MASS INDEX DOCD: CPT | Mod: CPTII,S$GLB,, | Performed by: FAMILY MEDICINE

## 2019-08-17 PROCEDURE — 3075F PR MOST RECENT SYSTOLIC BLOOD PRESS GE 130-139MM HG: ICD-10-PCS | Mod: CPTII,S$GLB,, | Performed by: FAMILY MEDICINE

## 2019-08-17 PROCEDURE — 93010 ELECTROCARDIOGRAM REPORT: CPT | Mod: S$GLB,,, | Performed by: INTERNAL MEDICINE

## 2019-08-17 PROCEDURE — 3080F PR MOST RECENT DIASTOLIC BLOOD PRESSURE >= 90 MM HG: ICD-10-PCS | Mod: CPTII,S$GLB,, | Performed by: FAMILY MEDICINE

## 2019-08-17 RX ORDER — LISINOPRIL AND HYDROCHLOROTHIAZIDE 12.5; 2 MG/1; MG/1
1 TABLET ORAL DAILY
Qty: 30 TABLET | Refills: 3 | Status: SHIPPED | OUTPATIENT
Start: 2019-08-17 | End: 2019-11-09 | Stop reason: SDUPTHER

## 2019-08-17 NOTE — PROGRESS NOTES
Chief Complaint  Chief Complaint   Patient presents with    Abnormal EKG       HPI  Manjit Silverio is a 44 y.o. male with multiple medical diagnoses as listed in the medical history and problem list that presents for an abnormal EKG.  He had an EKG done at work that show an ST abnormality.  He denies chest pain or SOB.  He can walk up a flight of stairs without difficulty.  He need a note for work that clears him.  He has a history of htn that is uncontrolled.    No family history of early CAD.      PAST MEDICAL HISTORY:  Past Medical History:   Diagnosis Date    Hypertension        PAST SURGICAL HISTORY:  History reviewed. No pertinent surgical history.    SOCIAL HISTORY:  Social History     Socioeconomic History    Marital status:      Spouse name: Not on file    Number of children: Not on file    Years of education: Not on file    Highest education level: Not on file   Occupational History    Not on file   Social Needs    Financial resource strain: Not on file    Food insecurity:     Worry: Not on file     Inability: Not on file    Transportation needs:     Medical: Not on file     Non-medical: Not on file   Tobacco Use    Smoking status: Never Smoker    Smokeless tobacco: Never Used   Substance and Sexual Activity    Alcohol use: No    Drug use: Not on file    Sexual activity: Not on file   Lifestyle    Physical activity:     Days per week: Not on file     Minutes per session: Not on file    Stress: Not on file   Relationships    Social connections:     Talks on phone: Not on file     Gets together: Not on file     Attends Rastafari service: Not on file     Active member of club or organization: Not on file     Attends meetings of clubs or organizations: Not on file     Relationship status: Not on file   Other Topics Concern    Not on file   Social History Narrative    Not on file       FAMILY HISTORY:  Family History   Problem Relation Age of Onset    Stroke Father        ALLERGIES  "AND MEDICATIONS: updated and reviewed.  Review of patient's allergies indicates:  No Known Allergies  Current Outpatient Medications   Medication Sig Dispense Refill    lisinopril-hydrochlorothiazide (PRINZIDE,ZESTORETIC) 20-12.5 mg per tablet Take 1 tablet by mouth once daily. 30 tablet 3     No current facility-administered medications for this visit.      Family History   Problem Relation Age of Onset    Stroke Father          ROS  Review of Systems   Constitutional: Negative for activity change, appetite change, chills and fatigue.   HENT: Negative for congestion, ear discharge, hearing loss, mouth sores, rhinorrhea, sinus pain and trouble swallowing.    Eyes: Negative for photophobia, pain, discharge and visual disturbance.   Respiratory: Negative for cough, chest tightness, shortness of breath and wheezing.    Cardiovascular: Negative for chest pain, palpitations and leg swelling.   Gastrointestinal: Negative for abdominal distention, abdominal pain, blood in stool, constipation, diarrhea, nausea and vomiting.   Genitourinary: Negative for difficulty urinating, dysuria and flank pain.   Musculoskeletal: Negative for arthralgias, back pain, gait problem, joint swelling and myalgias.   Skin: Negative for color change and rash.   Neurological: Negative for dizziness, tremors, speech difficulty, light-headedness, numbness and headaches.   Psychiatric/Behavioral: Negative for behavioral problems, confusion, hallucinations, sleep disturbance and suicidal ideas.           PHYSICAL EXAM  Vitals:    08/17/19 1131   BP: (!) 136/92   Pulse: 100   Temp: 98.2 °F (36.8 °C)   SpO2: 96%   Weight: 122.1 kg (269 lb 2.9 oz)   Height: 5' 4" (1.626 m)    Body mass index is 46.2 kg/m².  Weight: 122.1 kg (269 lb 2.9 oz)   Height: 5' 4" (162.6 cm)     Physical Exam   Constitutional: He appears well-developed.   HENT:   Head: Normocephalic.   Eyes: Conjunctivae are normal.   Neck: Normal range of motion. Neck supple. "   Cardiovascular: Normal rate and regular rhythm.   Pulmonary/Chest: Effort normal and breath sounds normal.   Neurological: He is alert.   Skin: Skin is warm.   Psychiatric: He has a normal mood and affect.   Nursing note and vitals reviewed.        Health Maintenance       Date Due Completion Date    Influenza Vaccine (1) 10/01/2019 (Originally 8/1/2019) 1/9/2019    Lipid Panel 10/17/2023 10/17/2018    TETANUS VACCINE 04/03/2029 4/3/2019        EKG:  Bundle branch block.  Sinus rhythm.     Assessment & Plan      Manjit was seen today for abnormal ekg.    Diagnoses and all orders for this visit:    Abnormal EKG  -     IN OFFICE EKG 12-LEAD (to Lillie)  -     Ambulatory referral to Cardiology  - Previous EKG's have been normal by patient's report, so this apparently represents a change.  Likely needs a stress test.     Other orders  -     lisinopril-hydrochlorothiazide (PRINZIDE,ZESTORETIC) 20-12.5 mg per tablet; Take 1 tablet by mouth once daily.          Follow-up: No follow-ups on file.

## 2019-08-21 ENCOUNTER — OFFICE VISIT (OUTPATIENT)
Dept: CARDIOLOGY | Facility: CLINIC | Age: 44
End: 2019-08-21
Payer: COMMERCIAL

## 2019-08-21 VITALS
OXYGEN SATURATION: 98 % | WEIGHT: 268 LBS | HEART RATE: 68 BPM | SYSTOLIC BLOOD PRESSURE: 138 MMHG | DIASTOLIC BLOOD PRESSURE: 89 MMHG | HEIGHT: 64 IN | BODY MASS INDEX: 45.75 KG/M2

## 2019-08-21 DIAGNOSIS — I10 ESSENTIAL HYPERTENSION: ICD-10-CM

## 2019-08-21 DIAGNOSIS — I45.10 INCOMPLETE RBBB: ICD-10-CM

## 2019-08-21 PROCEDURE — 3079F DIAST BP 80-89 MM HG: CPT | Mod: CPTII,S$GLB,, | Performed by: STUDENT IN AN ORGANIZED HEALTH CARE EDUCATION/TRAINING PROGRAM

## 2019-08-21 PROCEDURE — 3075F SYST BP GE 130 - 139MM HG: CPT | Mod: CPTII,S$GLB,, | Performed by: STUDENT IN AN ORGANIZED HEALTH CARE EDUCATION/TRAINING PROGRAM

## 2019-08-21 PROCEDURE — 3075F PR MOST RECENT SYSTOLIC BLOOD PRESS GE 130-139MM HG: ICD-10-PCS | Mod: CPTII,S$GLB,, | Performed by: STUDENT IN AN ORGANIZED HEALTH CARE EDUCATION/TRAINING PROGRAM

## 2019-08-21 PROCEDURE — 3008F BODY MASS INDEX DOCD: CPT | Mod: CPTII,S$GLB,, | Performed by: STUDENT IN AN ORGANIZED HEALTH CARE EDUCATION/TRAINING PROGRAM

## 2019-08-21 PROCEDURE — 3008F PR BODY MASS INDEX (BMI) DOCUMENTED: ICD-10-PCS | Mod: CPTII,S$GLB,, | Performed by: STUDENT IN AN ORGANIZED HEALTH CARE EDUCATION/TRAINING PROGRAM

## 2019-08-21 PROCEDURE — 99204 OFFICE O/P NEW MOD 45 MIN: CPT | Mod: S$GLB,,, | Performed by: STUDENT IN AN ORGANIZED HEALTH CARE EDUCATION/TRAINING PROGRAM

## 2019-08-21 PROCEDURE — 3079F PR MOST RECENT DIASTOLIC BLOOD PRESSURE 80-89 MM HG: ICD-10-PCS | Mod: CPTII,S$GLB,, | Performed by: STUDENT IN AN ORGANIZED HEALTH CARE EDUCATION/TRAINING PROGRAM

## 2019-08-21 PROCEDURE — 99204 PR OFFICE/OUTPT VISIT, NEW, LEVL IV, 45-59 MIN: ICD-10-PCS | Mod: S$GLB,,, | Performed by: STUDENT IN AN ORGANIZED HEALTH CARE EDUCATION/TRAINING PROGRAM

## 2019-08-21 RX ORDER — ASPIRIN 81 MG/1
81 TABLET ORAL DAILY
Qty: 90 TABLET | Refills: 3 | Status: SHIPPED | OUTPATIENT
Start: 2019-08-21 | End: 2021-08-10

## 2019-08-21 NOTE — PROGRESS NOTES
"   Cardiology Clinic note    Subjective:   Patient ID:  Manjit Silverio is a 44 y.o. male who presents for evaluation of abnormal ecg.    HPI:   Manjit Silverio  has a past medical history of Hypertension. obesity. He presents as a referral from Dr. Moise for abn ecg.    He had an EKG done at work incomplete RBBB.  He denies chest pain or SOB.  He can walk up a flight of stairs without difficulty. He is walking about 10K steps a day, monitor with Fitbit. No RAMIREZ. He need a note for work that clears him.  Bp is controlled currently with lisinopril-hctz.  Non smoker  - no prior cardiac hx.    Vitals  Vitals:    08/21/19 0807   BP: 138/89   Pulse: 68   SpO2: 98%   Weight: 121.6 kg (268 lb)   Height: 5' 4" (1.626 m)       Patient Active Problem List    Diagnosis Date Noted    Incomplete RBBB 08/21/2019    Essential hypertension 08/21/2019       Patient's Medications   New Prescriptions    ASPIRIN (ECOTRIN) 81 MG EC TABLET    Take 1 tablet (81 mg total) by mouth once daily.   Previous Medications    LISINOPRIL-HYDROCHLOROTHIAZIDE (PRINZIDE,ZESTORETIC) 20-12.5 MG PER TABLET    Take 1 tablet by mouth once daily.   Modified Medications    No medications on file   Discontinued Medications    No medications on file         Review of Systems   Constitution: Negative for chills, decreased appetite, malaise/fatigue and weight gain.   HENT: Negative for congestion and ear discharge.    Eyes: Negative for blurred vision and double vision.   Cardiovascular: Negative for chest pain, cyanosis, dyspnea on exertion, irregular heartbeat, leg swelling, near-syncope, orthopnea, palpitations and syncope.   Respiratory: Negative for cough, shortness of breath and sleep disturbances due to breathing.    Skin: Negative for color change and dry skin.   Musculoskeletal: Negative for joint pain, joint swelling and muscle cramps.   Gastrointestinal: Negative for bloating, heartburn, hematemesis and hematochezia.   Genitourinary: Negative " for bladder incontinence and dysuria.   Neurological: Negative for aphonia, excessive daytime sleepiness, dizziness, focal weakness, headaches, light-headedness, loss of balance and weakness.   Psychiatric/Behavioral: Negative for altered mental status, depression and memory loss. The patient does not have insomnia and is not nervous/anxious.          Objective:   Physical Exam   Constitutional: He is oriented to person, place, and time. He appears well-developed and well-nourished.   HENT:   Head: Normocephalic and atraumatic.   Eyes: Conjunctivae and EOM are normal.   Neck: Normal range of motion. Neck supple. No JVD present.   Cardiovascular: Normal rate, regular rhythm and normal heart sounds.   No murmur heard.  Pulmonary/Chest: Effort normal and breath sounds normal. No respiratory distress. He has no wheezes. He has no rales.   Abdominal: Soft. Bowel sounds are normal. He exhibits no distension.   Musculoskeletal: Normal range of motion. He exhibits no edema.   Neurological: He is alert and oriented to person, place, and time.   Skin: Skin is warm and dry. No erythema.   Psychiatric: He has a normal mood and affect. His behavior is normal. Judgment and thought content normal.   Nursing note and vitals reviewed.      Lab Results    Lab Results   Component Value Date     10/17/2018    K 3.8 10/17/2018     10/17/2018    CO2 28 10/17/2018    BUN 15 10/17/2018    CREATININE 0.98 10/17/2018    GLU 95 10/17/2018    AST 19 10/17/2018    ALT 28 10/17/2018    ALBUMIN 4.3 10/17/2018    PROT 7.6 10/17/2018    BILITOT 0.4 10/17/2018    WBC 6.08 10/17/2018    HGB 15.0 10/17/2018    HCT 45.2 10/17/2018    MCV 85 10/17/2018     10/17/2018    TSH 1.130 10/17/2018    CHOL 176 10/17/2018    HDL 37 (L) 10/17/2018    LDLCALC 123.2 10/17/2018    TRIG 79 10/17/2018       Lipid panel  Lab Results   Component Value Date    CHOL 176 10/17/2018     Lab Results   Component Value Date    HDL 37 (L) 10/17/2018     Lab  Results   Component Value Date    LDLCALC 123.2 10/17/2018     Lab Results   Component Value Date    TRIG 79 10/17/2018       Cardiac Studies    ECG:  normal EKG, normal sinus rhythm, incomplete RBBB.    Cath study : n/a    Assessment:     1. Incomplete RBBB    2. Essential hypertension        Plan:     1. HTN  - controlled with home medications, continue    2. Incomplete RBBB on ecg  - asymptomatic, low RF of CAD, (HTN/obesity)  - ASCVD: 8.9%  - recommend aspirin 81mg daily  - likely needs low-moderate dose statin as well. Will await upcoming FLP - give statin if ASCVD still above 7.5%  - ok to return to work () no work restrictions.    Continue with current medical plan and lifestyle changes.  Return sooner for concerns or questions. If symptoms persist go to the ED  Total duration of face to face visit time 30 minutes.  Total time spent counseling greater than fifty percent of total visit time.  Counseling included discussion regarding imaging findings, diagnosis, possibilities, treatment options, risks and benefits.      No orders of the defined types were placed in this encounter.      Follow up as scheduled. Return to clinic in PRN   He expressed verbal understanding and agreed with the plan    Thank you for the opportunity to care for this patient. Will be available for questions if needed.     Wayne Ratliff MD [facility]  Interventional Cardiology  [facility]  Pager: [phone]

## 2019-08-21 NOTE — LETTER
August 21, 2019      Sobeida Moise, DO  [address] [address]           [place] [place] [facility]iology  [place] [address]  Phone: [phone]  Fax: [phone]          Patient: Manjit Silverio   MR Number: [id number]   YOB: 1975   Date of Visit: 8/21/2019       Dear Dr. Sobeida Moise:    Thank you for referring Manjit Silverio to me for evaluation. Attached you will find relevant portions of my assessment and plan of care.    If you have questions, please do not hesitate to call me. I look forward to following Manjit Silverio along with you.    Sincerely,    Wayne Ratliff MD    Enclosure  CC:  No Recipients    If you would like to receive this communication electronically, please contact [email or web address] or [phone] to request more information on [employer] Link access.    For providers and/or their staff who would like to refer a patient to [facility], please contact us through our one-stop-shop provider referral line, [facility], at [phone].    If you feel you have received this communication in error or would no longer like to receive these types of communications, please e-mail [email or web address]

## 2019-08-21 NOTE — LETTER
August 21, 2019      [facility]  [address] [place] [address]  [place] [address]  Phone: [phone]  Fax: [phone]       Patient: Manjit Silverio   YOB: 1975  Date of Visit: 08/21/2019    To Whom It May Concern:    Lakshmi Silverio  was at [facility] on 08/21/2019. He may return to work 8/21/19 with no restrictions. Abnormal EKG is within normal limits. No work restrictions. His prognosis is excellent.   If you have any questions or concerns, or if I can be of further assistance, please do not hesitate to contact me.    Sincerely,      Wayne Ratliff MD

## 2019-08-22 ENCOUNTER — PATIENT MESSAGE (OUTPATIENT)
Dept: FAMILY MEDICINE | Facility: CLINIC | Age: 44
End: 2019-08-22

## 2019-08-22 ENCOUNTER — LAB VISIT (OUTPATIENT)
Dept: LAB | Facility: HOSPITAL | Age: 44
End: 2019-08-22
Attending: FAMILY MEDICINE
Payer: COMMERCIAL

## 2019-08-22 DIAGNOSIS — E78.5 HYPERLIPIDEMIA, UNSPECIFIED HYPERLIPIDEMIA TYPE: Primary | ICD-10-CM

## 2019-08-22 DIAGNOSIS — Z00.00 WELLNESS EXAMINATION: ICD-10-CM

## 2019-08-22 LAB
CHOLEST SERPL-MCNC: 189 MG/DL (ref 120–199)
CHOLEST/HDLC SERPL: 6.3 {RATIO} (ref 2–5)
COMPLEXED PSA SERPL-MCNC: 2.4 NG/ML (ref 0–4)
GLUCOSE SERPL-MCNC: 100 MG/DL (ref 70–110)
HDLC SERPL-MCNC: 30 MG/DL (ref 40–75)
HDLC SERPL: 15.9 % (ref 20–50)
LDLC SERPL CALC-MCNC: 129 MG/DL (ref 63–159)
NONHDLC SERPL-MCNC: 159 MG/DL
TRIGL SERPL-MCNC: 150 MG/DL (ref 30–150)

## 2019-08-22 PROCEDURE — 84153 ASSAY OF PSA TOTAL: CPT

## 2019-08-22 PROCEDURE — 82947 ASSAY GLUCOSE BLOOD QUANT: CPT | Mod: PO

## 2019-08-22 PROCEDURE — 36415 COLL VENOUS BLD VENIPUNCTURE: CPT | Mod: PO

## 2019-08-22 PROCEDURE — 80061 LIPID PANEL: CPT

## 2019-08-22 RX ORDER — ATORVASTATIN CALCIUM 10 MG/1
10 TABLET, FILM COATED ORAL DAILY
Qty: 90 TABLET | Refills: 3 | Status: SHIPPED | OUTPATIENT
Start: 2019-08-22 | End: 2019-11-21 | Stop reason: SDUPTHER

## 2019-08-25 ENCOUNTER — PATIENT MESSAGE (OUTPATIENT)
Dept: FAMILY MEDICINE | Facility: CLINIC | Age: 44
End: 2019-08-25

## 2019-08-25 ENCOUNTER — PATIENT MESSAGE (OUTPATIENT)
Dept: CARDIOLOGY | Facility: CLINIC | Age: 44
End: 2019-08-25

## 2019-10-24 DIAGNOSIS — I10 ESSENTIAL HYPERTENSION: ICD-10-CM

## 2019-10-24 RX ORDER — AMLODIPINE BESYLATE 10 MG/1
TABLET ORAL
Qty: 90 TABLET | Refills: 1 | Status: SHIPPED | OUTPATIENT
Start: 2019-10-24 | End: 2020-03-18

## 2019-11-09 RX ORDER — LISINOPRIL AND HYDROCHLOROTHIAZIDE 12.5; 2 MG/1; MG/1
TABLET ORAL
Qty: 90 TABLET | Refills: 1 | Status: SHIPPED | OUTPATIENT
Start: 2019-11-09 | End: 2019-11-21 | Stop reason: SDUPTHER

## 2019-11-21 DIAGNOSIS — E78.5 HYPERLIPIDEMIA, UNSPECIFIED HYPERLIPIDEMIA TYPE: ICD-10-CM

## 2019-11-21 RX ORDER — ATORVASTATIN CALCIUM 10 MG/1
10 TABLET, FILM COATED ORAL DAILY
Qty: 90 TABLET | Refills: 3 | Status: SHIPPED | OUTPATIENT
Start: 2019-11-21 | End: 2020-05-29 | Stop reason: SDUPTHER

## 2019-11-21 RX ORDER — LISINOPRIL AND HYDROCHLOROTHIAZIDE 12.5; 2 MG/1; MG/1
1 TABLET ORAL DAILY
Qty: 90 TABLET | Refills: 1 | Status: SHIPPED | OUTPATIENT
Start: 2019-11-21 | End: 2020-05-29 | Stop reason: SDUPTHER

## 2020-03-18 ENCOUNTER — OFFICE VISIT (OUTPATIENT)
Dept: FAMILY MEDICINE | Facility: CLINIC | Age: 45
End: 2020-03-18
Payer: COMMERCIAL

## 2020-03-18 VITALS
DIASTOLIC BLOOD PRESSURE: 86 MMHG | OXYGEN SATURATION: 95 % | HEIGHT: 64 IN | SYSTOLIC BLOOD PRESSURE: 120 MMHG | WEIGHT: 252.56 LBS | HEART RATE: 106 BPM | BODY MASS INDEX: 43.12 KG/M2 | TEMPERATURE: 98 F

## 2020-03-18 DIAGNOSIS — Z00.00 WELLNESS EXAMINATION: Primary | ICD-10-CM

## 2020-03-18 PROCEDURE — 3008F BODY MASS INDEX DOCD: CPT | Mod: CPTII,S$GLB,, | Performed by: FAMILY MEDICINE

## 2020-03-18 PROCEDURE — 99213 PR OFFICE/OUTPT VISIT, EST, LEVL III, 20-29 MIN: ICD-10-PCS | Mod: S$GLB,,, | Performed by: FAMILY MEDICINE

## 2020-03-18 PROCEDURE — 3008F PR BODY MASS INDEX (BMI) DOCUMENTED: ICD-10-PCS | Mod: CPTII,S$GLB,, | Performed by: FAMILY MEDICINE

## 2020-03-18 PROCEDURE — 99213 OFFICE O/P EST LOW 20 MIN: CPT | Mod: S$GLB,,, | Performed by: FAMILY MEDICINE

## 2020-03-18 PROCEDURE — 3074F SYST BP LT 130 MM HG: CPT | Mod: CPTII,S$GLB,, | Performed by: FAMILY MEDICINE

## 2020-03-18 PROCEDURE — 3079F PR MOST RECENT DIASTOLIC BLOOD PRESSURE 80-89 MM HG: ICD-10-PCS | Mod: CPTII,S$GLB,, | Performed by: FAMILY MEDICINE

## 2020-03-18 PROCEDURE — 3074F PR MOST RECENT SYSTOLIC BLOOD PRESSURE < 130 MM HG: ICD-10-PCS | Mod: CPTII,S$GLB,, | Performed by: FAMILY MEDICINE

## 2020-03-18 PROCEDURE — 3079F DIAST BP 80-89 MM HG: CPT | Mod: CPTII,S$GLB,, | Performed by: FAMILY MEDICINE

## 2020-03-18 NOTE — PROGRESS NOTES
"Subjective:      Patient ID: Manjit Silverio is a 44 y.o. male.    Chief Complaint: Emergency f/u      Vitals:    03/18/20 1554   BP: 120/86   Pulse: 106   Temp: 98.1 °F (36.7 °C)   SpO2: 95%   Weight: 114.5 kg (252 lb 8.6 oz)   Height: 5' 4" (1.626 m)        HPI   Follow up, wellness, lost weight 20 pounds, cardio, eating better; lost 20 plus pounds; goal is to lose 40 more, 210 goal, on bp Rx and cholesterol    Review of Systems   Constitutional: Negative for activity change and unexpected weight change.   HENT: Negative for hearing loss, rhinorrhea and trouble swallowing.    Eyes: Negative for discharge and visual disturbance.   Respiratory: Negative for chest tightness and wheezing.    Cardiovascular: Negative for chest pain and palpitations.   Gastrointestinal: Negative for blood in stool, constipation, diarrhea and vomiting.   Endocrine: Negative for polydipsia and polyuria.   Genitourinary: Negative for difficulty urinating, hematuria and urgency.   Musculoskeletal: Negative for arthralgias, joint swelling and neck pain.   Neurological: Negative for weakness and headaches.   Psychiatric/Behavioral: Negative for confusion and dysphoric mood.   All other systems reviewed and are negative.    Objective:     Physical Exam   Constitutional: He is oriented to person, place, and time. He appears well-developed and well-nourished. No distress.   HENT:   Head: Normocephalic and atraumatic.   Right Ear: External ear normal.   Left Ear: External ear normal.   Mouth/Throat: Oropharynx is clear and moist. No oropharyngeal exudate.   Eyes: Pupils are equal, round, and reactive to light. Conjunctivae and EOM are normal. Right eye exhibits no discharge. Left eye exhibits no discharge. No scleral icterus.   Neck: Normal range of motion. Neck supple. No JVD present. No tracheal deviation present. No thyromegaly present.   Cardiovascular: Normal rate and regular rhythm. Exam reveals no gallop and no friction rub.   No murmur " heard.  Pulmonary/Chest: Effort normal and breath sounds normal. No stridor. No respiratory distress. He has no wheezes. He has no rales. He exhibits no tenderness.   Abdominal: Soft. He exhibits no distension and no mass. There is no tenderness. There is no rebound and no guarding.   Musculoskeletal: Normal range of motion. He exhibits no edema or tenderness.   Lymphadenopathy:     He has no cervical adenopathy.   Neurological: He is alert and oriented to person, place, and time. He has normal reflexes. He displays normal reflexes. No cranial nerve deficit. He exhibits normal muscle tone. Coordination normal.   Skin: Skin is warm and dry. No rash noted. He is not diaphoretic. No erythema. No pallor.   Psychiatric: He has a normal mood and affect. His behavior is normal. Judgment and thought content normal.   Nursing note and vitals reviewed.    Assessment:     1. Wellness examination      Plan:        Medication List           Accurate as of March 18, 2020  4:49 PM. If you have any questions, ask your nurse or doctor.               CONTINUE taking these medications    aspirin 81 MG EC tablet  Commonly known as:  ECOTRIN  Take 1 tablet (81 mg total) by mouth once daily.     atorvastatin 10 MG tablet  Commonly known as:  LIPITOR  Take 1 tablet (10 mg total) by mouth once daily.     lisinopriL-hydrochlorothiazide 20-12.5 mg per tablet  Commonly known as:  PRINZIDE,ZESTORETIC  Take 1 tablet by mouth once daily.        STOP taking these medications    amLODIPine 10 MG tablet  Commonly known as:  NORVASC  Stopped by:  Armen Bazan MD          Wellness examination  -     CBC auto differential; Future; Expected date: 03/18/2020  -     Comprehensive metabolic panel; Future; Expected date: 03/18/2020  -     Lipid panel; Future  -     Urinalysis; Future  -     Vitamin D; Future  -     TSH; Future  -     PSA, Screening; Future

## 2020-05-22 RX ORDER — LISINOPRIL AND HYDROCHLOROTHIAZIDE 12.5; 2 MG/1; MG/1
TABLET ORAL
Qty: 90 TABLET | Refills: 1 | OUTPATIENT
Start: 2020-05-22

## 2020-05-29 DIAGNOSIS — E78.5 HYPERLIPIDEMIA, UNSPECIFIED HYPERLIPIDEMIA TYPE: ICD-10-CM

## 2020-06-01 RX ORDER — ATORVASTATIN CALCIUM 10 MG/1
10 TABLET, FILM COATED ORAL DAILY
Qty: 90 TABLET | Refills: 3 | Status: SHIPPED | OUTPATIENT
Start: 2020-06-01 | End: 2021-01-12 | Stop reason: SDUPTHER

## 2020-06-01 RX ORDER — LISINOPRIL AND HYDROCHLOROTHIAZIDE 12.5; 2 MG/1; MG/1
1 TABLET ORAL DAILY
Qty: 90 TABLET | Refills: 1 | Status: SHIPPED | OUTPATIENT
Start: 2020-06-01 | End: 2021-01-12

## 2021-01-12 ENCOUNTER — OFFICE VISIT (OUTPATIENT)
Dept: INTERNAL MEDICINE | Facility: CLINIC | Age: 46
End: 2021-01-12
Payer: COMMERCIAL

## 2021-01-12 VITALS
HEIGHT: 64 IN | DIASTOLIC BLOOD PRESSURE: 94 MMHG | BODY MASS INDEX: 46.63 KG/M2 | WEIGHT: 273.13 LBS | SYSTOLIC BLOOD PRESSURE: 144 MMHG | OXYGEN SATURATION: 99 % | HEART RATE: 95 BPM

## 2021-01-12 DIAGNOSIS — I10 ESSENTIAL HYPERTENSION: ICD-10-CM

## 2021-01-12 DIAGNOSIS — E78.00 PURE HYPERCHOLESTEROLEMIA: ICD-10-CM

## 2021-01-12 DIAGNOSIS — E78.5 HYPERLIPIDEMIA, UNSPECIFIED HYPERLIPIDEMIA TYPE: ICD-10-CM

## 2021-01-12 DIAGNOSIS — R05.9 COUGH: Primary | ICD-10-CM

## 2021-01-12 DIAGNOSIS — E66.01 MORBID OBESITY: ICD-10-CM

## 2021-01-12 PROCEDURE — 99214 PR OFFICE/OUTPT VISIT, EST, LEVL IV, 30-39 MIN: ICD-10-PCS | Mod: S$GLB,,, | Performed by: INTERNAL MEDICINE

## 2021-01-12 PROCEDURE — 3008F BODY MASS INDEX DOCD: CPT | Mod: CPTII,S$GLB,, | Performed by: INTERNAL MEDICINE

## 2021-01-12 PROCEDURE — 3008F PR BODY MASS INDEX (BMI) DOCUMENTED: ICD-10-PCS | Mod: CPTII,S$GLB,, | Performed by: INTERNAL MEDICINE

## 2021-01-12 PROCEDURE — 93010 ELECTROCARDIOGRAM REPORT: CPT | Mod: S$GLB,,, | Performed by: INTERNAL MEDICINE

## 2021-01-12 PROCEDURE — 93005 ELECTROCARDIOGRAM TRACING: CPT | Mod: S$GLB,,, | Performed by: INTERNAL MEDICINE

## 2021-01-12 PROCEDURE — 3077F SYST BP >= 140 MM HG: CPT | Mod: CPTII,S$GLB,, | Performed by: INTERNAL MEDICINE

## 2021-01-12 PROCEDURE — 99999 PR PBB SHADOW E&M-EST. PATIENT-LVL III: ICD-10-PCS | Mod: PBBFAC,,, | Performed by: INTERNAL MEDICINE

## 2021-01-12 PROCEDURE — 3080F PR MOST RECENT DIASTOLIC BLOOD PRESSURE >= 90 MM HG: ICD-10-PCS | Mod: CPTII,S$GLB,, | Performed by: INTERNAL MEDICINE

## 2021-01-12 PROCEDURE — 99999 PR PBB SHADOW E&M-EST. PATIENT-LVL III: CPT | Mod: PBBFAC,,, | Performed by: INTERNAL MEDICINE

## 2021-01-12 PROCEDURE — 3077F PR MOST RECENT SYSTOLIC BLOOD PRESSURE >= 140 MM HG: ICD-10-PCS | Mod: CPTII,S$GLB,, | Performed by: INTERNAL MEDICINE

## 2021-01-12 PROCEDURE — 93005 EKG 12-LEAD: ICD-10-PCS | Mod: S$GLB,,, | Performed by: INTERNAL MEDICINE

## 2021-01-12 PROCEDURE — 1126F PR PAIN SEVERITY QUANTIFIED, NO PAIN PRESENT: ICD-10-PCS | Mod: S$GLB,,, | Performed by: INTERNAL MEDICINE

## 2021-01-12 PROCEDURE — 1126F AMNT PAIN NOTED NONE PRSNT: CPT | Mod: S$GLB,,, | Performed by: INTERNAL MEDICINE

## 2021-01-12 PROCEDURE — 3080F DIAST BP >= 90 MM HG: CPT | Mod: CPTII,S$GLB,, | Performed by: INTERNAL MEDICINE

## 2021-01-12 PROCEDURE — 99214 OFFICE O/P EST MOD 30 MIN: CPT | Mod: S$GLB,,, | Performed by: INTERNAL MEDICINE

## 2021-01-12 PROCEDURE — 93010 EKG 12-LEAD: ICD-10-PCS | Mod: S$GLB,,, | Performed by: INTERNAL MEDICINE

## 2021-01-12 RX ORDER — LOSARTAN POTASSIUM AND HYDROCHLOROTHIAZIDE 25; 100 MG/1; MG/1
1 TABLET ORAL DAILY
Qty: 90 TABLET | Refills: 3 | Status: SHIPPED | OUTPATIENT
Start: 2021-01-12 | End: 2021-04-12 | Stop reason: SDUPTHER

## 2021-01-12 RX ORDER — ATORVASTATIN CALCIUM 10 MG/1
10 TABLET, FILM COATED ORAL DAILY
Qty: 90 TABLET | Refills: 3 | Status: SHIPPED | OUTPATIENT
Start: 2021-01-12 | End: 2021-04-12 | Stop reason: SDUPTHER

## 2021-01-12 RX ORDER — BENZONATATE 200 MG/1
200 CAPSULE ORAL 3 TIMES DAILY PRN
Qty: 30 CAPSULE | Refills: 3 | Status: SHIPPED | OUTPATIENT
Start: 2021-01-12 | End: 2021-01-22

## 2021-01-26 ENCOUNTER — TELEPHONE (OUTPATIENT)
Dept: INTERNAL MEDICINE | Facility: CLINIC | Age: 46
End: 2021-01-26

## 2021-01-26 ENCOUNTER — PATIENT OUTREACH (OUTPATIENT)
Dept: ADMINISTRATIVE | Facility: HOSPITAL | Age: 46
End: 2021-01-26

## 2021-01-26 DIAGNOSIS — Z13.220 SCREENING CHOLESTEROL LEVEL: Primary | ICD-10-CM

## 2021-02-02 ENCOUNTER — LAB VISIT (OUTPATIENT)
Dept: LAB | Facility: HOSPITAL | Age: 46
End: 2021-02-02
Attending: INTERNAL MEDICINE
Payer: COMMERCIAL

## 2021-02-02 DIAGNOSIS — Z13.220 SCREENING CHOLESTEROL LEVEL: ICD-10-CM

## 2021-02-02 LAB
CHOLEST SERPL-MCNC: 129 MG/DL (ref 120–199)
CHOLEST/HDLC SERPL: 4.4 {RATIO} (ref 2–5)
HDLC SERPL-MCNC: 29 MG/DL (ref 40–75)
HDLC SERPL: 22.5 % (ref 20–50)
LDLC SERPL CALC-MCNC: 79 MG/DL (ref 63–159)
NONHDLC SERPL-MCNC: 100 MG/DL
TRIGL SERPL-MCNC: 105 MG/DL (ref 30–150)

## 2021-02-02 PROCEDURE — 36415 COLL VENOUS BLD VENIPUNCTURE: CPT | Mod: PO

## 2021-02-02 PROCEDURE — 80061 LIPID PANEL: CPT

## 2021-02-09 ENCOUNTER — OFFICE VISIT (OUTPATIENT)
Dept: INTERNAL MEDICINE | Facility: CLINIC | Age: 46
End: 2021-02-09
Payer: COMMERCIAL

## 2021-02-09 VITALS
HEART RATE: 76 BPM | HEIGHT: 64 IN | BODY MASS INDEX: 46.84 KG/M2 | TEMPERATURE: 97 F | DIASTOLIC BLOOD PRESSURE: 78 MMHG | OXYGEN SATURATION: 95 % | WEIGHT: 274.38 LBS | SYSTOLIC BLOOD PRESSURE: 130 MMHG

## 2021-02-09 DIAGNOSIS — Z00.00 ROUTINE GENERAL MEDICAL EXAMINATION AT A HEALTH CARE FACILITY: Primary | ICD-10-CM

## 2021-02-09 DIAGNOSIS — I10 ESSENTIAL HYPERTENSION: ICD-10-CM

## 2021-02-09 DIAGNOSIS — E66.01 MORBID OBESITY: ICD-10-CM

## 2021-02-09 DIAGNOSIS — E78.00 PURE HYPERCHOLESTEROLEMIA: ICD-10-CM

## 2021-02-09 PROCEDURE — 3078F DIAST BP <80 MM HG: CPT | Mod: CPTII,S$GLB,, | Performed by: INTERNAL MEDICINE

## 2021-02-09 PROCEDURE — 3075F SYST BP GE 130 - 139MM HG: CPT | Mod: CPTII,S$GLB,, | Performed by: INTERNAL MEDICINE

## 2021-02-09 PROCEDURE — 3008F PR BODY MASS INDEX (BMI) DOCUMENTED: ICD-10-PCS | Mod: CPTII,S$GLB,, | Performed by: INTERNAL MEDICINE

## 2021-02-09 PROCEDURE — 99999 PR PBB SHADOW E&M-EST. PATIENT-LVL III: CPT | Mod: PBBFAC,,, | Performed by: INTERNAL MEDICINE

## 2021-02-09 PROCEDURE — 3008F BODY MASS INDEX DOCD: CPT | Mod: CPTII,S$GLB,, | Performed by: INTERNAL MEDICINE

## 2021-02-09 PROCEDURE — 99396 PREV VISIT EST AGE 40-64: CPT | Mod: S$GLB,,, | Performed by: INTERNAL MEDICINE

## 2021-02-09 PROCEDURE — 99396 PR PREVENTIVE VISIT,EST,40-64: ICD-10-PCS | Mod: S$GLB,,, | Performed by: INTERNAL MEDICINE

## 2021-02-09 PROCEDURE — 1126F PR PAIN SEVERITY QUANTIFIED, NO PAIN PRESENT: ICD-10-PCS | Mod: S$GLB,,, | Performed by: INTERNAL MEDICINE

## 2021-02-09 PROCEDURE — 3078F PR MOST RECENT DIASTOLIC BLOOD PRESSURE < 80 MM HG: ICD-10-PCS | Mod: CPTII,S$GLB,, | Performed by: INTERNAL MEDICINE

## 2021-02-09 PROCEDURE — 1126F AMNT PAIN NOTED NONE PRSNT: CPT | Mod: S$GLB,,, | Performed by: INTERNAL MEDICINE

## 2021-02-09 PROCEDURE — 3075F PR MOST RECENT SYSTOLIC BLOOD PRESS GE 130-139MM HG: ICD-10-PCS | Mod: CPTII,S$GLB,, | Performed by: INTERNAL MEDICINE

## 2021-02-09 PROCEDURE — 99999 PR PBB SHADOW E&M-EST. PATIENT-LVL III: ICD-10-PCS | Mod: PBBFAC,,, | Performed by: INTERNAL MEDICINE

## 2021-04-12 DIAGNOSIS — I10 ESSENTIAL HYPERTENSION: ICD-10-CM

## 2021-04-12 DIAGNOSIS — E78.5 HYPERLIPIDEMIA, UNSPECIFIED HYPERLIPIDEMIA TYPE: ICD-10-CM

## 2021-04-12 DIAGNOSIS — E78.00 PURE HYPERCHOLESTEROLEMIA: ICD-10-CM

## 2021-04-12 RX ORDER — LOSARTAN POTASSIUM AND HYDROCHLOROTHIAZIDE 25; 100 MG/1; MG/1
1 TABLET ORAL DAILY
Qty: 90 TABLET | Refills: 4 | Status: SHIPPED | OUTPATIENT
Start: 2021-04-12 | End: 2022-05-11

## 2021-04-12 RX ORDER — ATORVASTATIN CALCIUM 10 MG/1
10 TABLET, FILM COATED ORAL DAILY
Qty: 90 TABLET | Refills: 4 | Status: SHIPPED | OUTPATIENT
Start: 2021-04-12 | End: 2021-08-10 | Stop reason: SDUPTHER

## 2021-07-30 ENCOUNTER — TELEPHONE (OUTPATIENT)
Dept: INTERNAL MEDICINE | Facility: CLINIC | Age: 46
End: 2021-07-30

## 2021-08-10 ENCOUNTER — OFFICE VISIT (OUTPATIENT)
Dept: FAMILY MEDICINE | Facility: CLINIC | Age: 46
End: 2021-08-10
Payer: COMMERCIAL

## 2021-08-10 VITALS
BODY MASS INDEX: 45.24 KG/M2 | HEIGHT: 64 IN | WEIGHT: 265 LBS | SYSTOLIC BLOOD PRESSURE: 122 MMHG | TEMPERATURE: 98 F | OXYGEN SATURATION: 97 % | HEART RATE: 81 BPM | DIASTOLIC BLOOD PRESSURE: 82 MMHG

## 2021-08-10 DIAGNOSIS — E78.5 HYPERLIPIDEMIA, UNSPECIFIED HYPERLIPIDEMIA TYPE: ICD-10-CM

## 2021-08-10 DIAGNOSIS — I10 ESSENTIAL HYPERTENSION: Primary | ICD-10-CM

## 2021-08-10 DIAGNOSIS — E78.00 PURE HYPERCHOLESTEROLEMIA: ICD-10-CM

## 2021-08-10 DIAGNOSIS — R73.9 HYPERGLYCEMIA: ICD-10-CM

## 2021-08-10 DIAGNOSIS — Z12.5 PROSTATE CANCER SCREENING: ICD-10-CM

## 2021-08-10 DIAGNOSIS — E66.01 MORBID OBESITY: ICD-10-CM

## 2021-08-10 DIAGNOSIS — Z12.11 COLON CANCER SCREENING: ICD-10-CM

## 2021-08-10 PROCEDURE — 3008F BODY MASS INDEX DOCD: CPT | Mod: CPTII,S$GLB,, | Performed by: STUDENT IN AN ORGANIZED HEALTH CARE EDUCATION/TRAINING PROGRAM

## 2021-08-10 PROCEDURE — 3074F PR MOST RECENT SYSTOLIC BLOOD PRESSURE < 130 MM HG: ICD-10-PCS | Mod: CPTII,S$GLB,, | Performed by: STUDENT IN AN ORGANIZED HEALTH CARE EDUCATION/TRAINING PROGRAM

## 2021-08-10 PROCEDURE — 1160F PR REVIEW ALL MEDS BY PRESCRIBER/CLIN PHARMACIST DOCUMENTED: ICD-10-PCS | Mod: CPTII,S$GLB,, | Performed by: STUDENT IN AN ORGANIZED HEALTH CARE EDUCATION/TRAINING PROGRAM

## 2021-08-10 PROCEDURE — 1126F PR PAIN SEVERITY QUANTIFIED, NO PAIN PRESENT: ICD-10-PCS | Mod: CPTII,S$GLB,, | Performed by: STUDENT IN AN ORGANIZED HEALTH CARE EDUCATION/TRAINING PROGRAM

## 2021-08-10 PROCEDURE — 1126F AMNT PAIN NOTED NONE PRSNT: CPT | Mod: CPTII,S$GLB,, | Performed by: STUDENT IN AN ORGANIZED HEALTH CARE EDUCATION/TRAINING PROGRAM

## 2021-08-10 PROCEDURE — 1159F MED LIST DOCD IN RCRD: CPT | Mod: CPTII,S$GLB,, | Performed by: STUDENT IN AN ORGANIZED HEALTH CARE EDUCATION/TRAINING PROGRAM

## 2021-08-10 PROCEDURE — 1160F RVW MEDS BY RX/DR IN RCRD: CPT | Mod: CPTII,S$GLB,, | Performed by: STUDENT IN AN ORGANIZED HEALTH CARE EDUCATION/TRAINING PROGRAM

## 2021-08-10 PROCEDURE — 99214 OFFICE O/P EST MOD 30 MIN: CPT | Mod: S$GLB,,, | Performed by: STUDENT IN AN ORGANIZED HEALTH CARE EDUCATION/TRAINING PROGRAM

## 2021-08-10 PROCEDURE — 3008F PR BODY MASS INDEX (BMI) DOCUMENTED: ICD-10-PCS | Mod: CPTII,S$GLB,, | Performed by: STUDENT IN AN ORGANIZED HEALTH CARE EDUCATION/TRAINING PROGRAM

## 2021-08-10 PROCEDURE — 3079F PR MOST RECENT DIASTOLIC BLOOD PRESSURE 80-89 MM HG: ICD-10-PCS | Mod: CPTII,S$GLB,, | Performed by: STUDENT IN AN ORGANIZED HEALTH CARE EDUCATION/TRAINING PROGRAM

## 2021-08-10 PROCEDURE — 1159F PR MEDICATION LIST DOCUMENTED IN MEDICAL RECORD: ICD-10-PCS | Mod: CPTII,S$GLB,, | Performed by: STUDENT IN AN ORGANIZED HEALTH CARE EDUCATION/TRAINING PROGRAM

## 2021-08-10 PROCEDURE — 3079F DIAST BP 80-89 MM HG: CPT | Mod: CPTII,S$GLB,, | Performed by: STUDENT IN AN ORGANIZED HEALTH CARE EDUCATION/TRAINING PROGRAM

## 2021-08-10 PROCEDURE — 3074F SYST BP LT 130 MM HG: CPT | Mod: CPTII,S$GLB,, | Performed by: STUDENT IN AN ORGANIZED HEALTH CARE EDUCATION/TRAINING PROGRAM

## 2021-08-10 PROCEDURE — 99214 PR OFFICE/OUTPT VISIT, EST, LEVL IV, 30-39 MIN: ICD-10-PCS | Mod: S$GLB,,, | Performed by: STUDENT IN AN ORGANIZED HEALTH CARE EDUCATION/TRAINING PROGRAM

## 2021-08-10 RX ORDER — ATORVASTATIN CALCIUM 10 MG/1
10 TABLET, FILM COATED ORAL DAILY
Qty: 90 TABLET | Refills: 4 | Status: SHIPPED | OUTPATIENT
Start: 2021-08-10 | End: 2022-08-11

## 2021-08-13 ENCOUNTER — LAB VISIT (OUTPATIENT)
Dept: LAB | Facility: HOSPITAL | Age: 46
End: 2021-08-13
Attending: STUDENT IN AN ORGANIZED HEALTH CARE EDUCATION/TRAINING PROGRAM
Payer: COMMERCIAL

## 2021-08-13 DIAGNOSIS — R73.9 HYPERGLYCEMIA: ICD-10-CM

## 2021-08-13 DIAGNOSIS — Z12.5 PROSTATE CANCER SCREENING: ICD-10-CM

## 2021-08-13 DIAGNOSIS — I10 ESSENTIAL HYPERTENSION: ICD-10-CM

## 2021-08-13 LAB
ALBUMIN SERPL BCP-MCNC: 4.2 G/DL (ref 3.5–5.2)
ALP SERPL-CCNC: 71 U/L (ref 38–126)
ALT SERPL W/O P-5'-P-CCNC: 33 U/L (ref 10–44)
ANION GAP SERPL CALC-SCNC: 10 MMOL/L (ref 8–16)
AST SERPL-CCNC: 25 U/L (ref 15–46)
BASOPHILS # BLD AUTO: 0.05 K/UL (ref 0–0.2)
BASOPHILS NFR BLD: 0.9 % (ref 0–1.9)
BILIRUB SERPL-MCNC: 0.5 MG/DL (ref 0.1–1)
CALCIUM SERPL-MCNC: 9.1 MG/DL (ref 8.7–10.5)
CHLORIDE SERPL-SCNC: 102 MMOL/L (ref 95–110)
CO2 SERPL-SCNC: 29 MMOL/L (ref 23–29)
COMPLEXED PSA SERPL-MCNC: 2.6 NG/ML (ref 0–4)
CREAT SERPL-MCNC: 1.11 MG/DL (ref 0.5–1.4)
DIFFERENTIAL METHOD: NORMAL
EOSINOPHIL # BLD AUTO: 0.1 K/UL (ref 0–0.5)
EOSINOPHIL NFR BLD: 1.2 % (ref 0–8)
ERYTHROCYTE [DISTWIDTH] IN BLOOD BY AUTOMATED COUNT: 13.3 % (ref 11.5–14.5)
EST. GFR  (AFRICAN AMERICAN): >60 ML/MIN/1.73 M^2
EST. GFR  (NON AFRICAN AMERICAN): >60 ML/MIN/1.73 M^2
ESTIMATED AVG GLUCOSE: 111 MG/DL (ref 68–131)
GLUCOSE SERPL-MCNC: 108 MG/DL (ref 70–110)
HBA1C MFR BLD: 5.5 % (ref 4–5.6)
HCT VFR BLD AUTO: 43.4 % (ref 40–54)
HGB BLD-MCNC: 14.2 G/DL (ref 14–18)
IMM GRANULOCYTES # BLD AUTO: 0.01 K/UL (ref 0–0.04)
IMM GRANULOCYTES NFR BLD AUTO: 0.2 % (ref 0–0.5)
LYMPHOCYTES # BLD AUTO: 2.8 K/UL (ref 1–4.8)
LYMPHOCYTES NFR BLD: 47.6 % (ref 18–48)
MCH RBC QN AUTO: 28.9 PG (ref 27–31)
MCHC RBC AUTO-ENTMCNC: 32.7 G/DL (ref 32–36)
MCV RBC AUTO: 88 FL (ref 82–98)
MONOCYTES # BLD AUTO: 0.6 K/UL (ref 0.3–1)
MONOCYTES NFR BLD: 9.8 % (ref 4–15)
NEUTROPHILS # BLD AUTO: 2.4 K/UL (ref 1.8–7.7)
NEUTROPHILS NFR BLD: 40.3 % (ref 38–73)
NRBC BLD-RTO: 0 /100 WBC
PLATELET # BLD AUTO: 201 K/UL (ref 150–450)
PMV BLD AUTO: 11.3 FL (ref 9.2–12.9)
POTASSIUM SERPL-SCNC: 3.8 MMOL/L (ref 3.5–5.1)
PROT SERPL-MCNC: 7 G/DL (ref 6–8.4)
RBC # BLD AUTO: 4.92 M/UL (ref 4.6–6.2)
SODIUM SERPL-SCNC: 141 MMOL/L (ref 136–145)
UUN UR-MCNC: 17 MG/DL (ref 2–20)
WBC # BLD AUTO: 5.82 K/UL (ref 3.9–12.7)

## 2021-08-13 PROCEDURE — 85025 COMPLETE CBC W/AUTO DIFF WBC: CPT | Mod: PO | Performed by: STUDENT IN AN ORGANIZED HEALTH CARE EDUCATION/TRAINING PROGRAM

## 2021-08-13 PROCEDURE — 80053 COMPREHEN METABOLIC PANEL: CPT | Mod: PO | Performed by: STUDENT IN AN ORGANIZED HEALTH CARE EDUCATION/TRAINING PROGRAM

## 2021-08-13 PROCEDURE — 84153 ASSAY OF PSA TOTAL: CPT | Performed by: STUDENT IN AN ORGANIZED HEALTH CARE EDUCATION/TRAINING PROGRAM

## 2021-08-13 PROCEDURE — 36415 COLL VENOUS BLD VENIPUNCTURE: CPT | Mod: PO | Performed by: STUDENT IN AN ORGANIZED HEALTH CARE EDUCATION/TRAINING PROGRAM

## 2021-08-13 PROCEDURE — 83036 HEMOGLOBIN GLYCOSYLATED A1C: CPT | Performed by: STUDENT IN AN ORGANIZED HEALTH CARE EDUCATION/TRAINING PROGRAM

## 2021-10-07 ENCOUNTER — PATIENT MESSAGE (OUTPATIENT)
Dept: FAMILY MEDICINE | Facility: CLINIC | Age: 46
End: 2021-10-07

## 2021-10-07 ENCOUNTER — OFFICE VISIT (OUTPATIENT)
Dept: FAMILY MEDICINE | Facility: CLINIC | Age: 46
End: 2021-10-07
Payer: COMMERCIAL

## 2021-10-07 VITALS
OXYGEN SATURATION: 97 % | DIASTOLIC BLOOD PRESSURE: 78 MMHG | BODY MASS INDEX: 45.41 KG/M2 | SYSTOLIC BLOOD PRESSURE: 124 MMHG | WEIGHT: 266 LBS | HEIGHT: 64 IN | HEART RATE: 96 BPM

## 2021-10-07 DIAGNOSIS — M25.561 ACUTE PAIN OF RIGHT KNEE: ICD-10-CM

## 2021-10-07 DIAGNOSIS — R74.02 ELEVATED LDH: Primary | ICD-10-CM

## 2021-10-07 PROCEDURE — 1160F RVW MEDS BY RX/DR IN RCRD: CPT | Mod: CPTII,S$GLB,, | Performed by: STUDENT IN AN ORGANIZED HEALTH CARE EDUCATION/TRAINING PROGRAM

## 2021-10-07 PROCEDURE — 3078F PR MOST RECENT DIASTOLIC BLOOD PRESSURE < 80 MM HG: ICD-10-PCS | Mod: CPTII,S$GLB,, | Performed by: STUDENT IN AN ORGANIZED HEALTH CARE EDUCATION/TRAINING PROGRAM

## 2021-10-07 PROCEDURE — 99212 PR OFFICE/OUTPT VISIT, EST, LEVL II, 10-19 MIN: ICD-10-PCS | Mod: S$GLB,,, | Performed by: STUDENT IN AN ORGANIZED HEALTH CARE EDUCATION/TRAINING PROGRAM

## 2021-10-07 PROCEDURE — 1159F PR MEDICATION LIST DOCUMENTED IN MEDICAL RECORD: ICD-10-PCS | Mod: CPTII,S$GLB,, | Performed by: STUDENT IN AN ORGANIZED HEALTH CARE EDUCATION/TRAINING PROGRAM

## 2021-10-07 PROCEDURE — 3008F BODY MASS INDEX DOCD: CPT | Mod: CPTII,S$GLB,, | Performed by: STUDENT IN AN ORGANIZED HEALTH CARE EDUCATION/TRAINING PROGRAM

## 2021-10-07 PROCEDURE — 3074F SYST BP LT 130 MM HG: CPT | Mod: CPTII,S$GLB,, | Performed by: STUDENT IN AN ORGANIZED HEALTH CARE EDUCATION/TRAINING PROGRAM

## 2021-10-07 PROCEDURE — 3074F PR MOST RECENT SYSTOLIC BLOOD PRESSURE < 130 MM HG: ICD-10-PCS | Mod: CPTII,S$GLB,, | Performed by: STUDENT IN AN ORGANIZED HEALTH CARE EDUCATION/TRAINING PROGRAM

## 2021-10-07 PROCEDURE — 1160F PR REVIEW ALL MEDS BY PRESCRIBER/CLIN PHARMACIST DOCUMENTED: ICD-10-PCS | Mod: CPTII,S$GLB,, | Performed by: STUDENT IN AN ORGANIZED HEALTH CARE EDUCATION/TRAINING PROGRAM

## 2021-10-07 PROCEDURE — 3044F PR MOST RECENT HEMOGLOBIN A1C LEVEL <7.0%: ICD-10-PCS | Mod: CPTII,S$GLB,, | Performed by: STUDENT IN AN ORGANIZED HEALTH CARE EDUCATION/TRAINING PROGRAM

## 2021-10-07 PROCEDURE — 99212 OFFICE O/P EST SF 10 MIN: CPT | Mod: S$GLB,,, | Performed by: STUDENT IN AN ORGANIZED HEALTH CARE EDUCATION/TRAINING PROGRAM

## 2021-10-07 PROCEDURE — 3008F PR BODY MASS INDEX (BMI) DOCUMENTED: ICD-10-PCS | Mod: CPTII,S$GLB,, | Performed by: STUDENT IN AN ORGANIZED HEALTH CARE EDUCATION/TRAINING PROGRAM

## 2021-10-07 PROCEDURE — 3078F DIAST BP <80 MM HG: CPT | Mod: CPTII,S$GLB,, | Performed by: STUDENT IN AN ORGANIZED HEALTH CARE EDUCATION/TRAINING PROGRAM

## 2021-10-07 PROCEDURE — 3044F HG A1C LEVEL LT 7.0%: CPT | Mod: CPTII,S$GLB,, | Performed by: STUDENT IN AN ORGANIZED HEALTH CARE EDUCATION/TRAINING PROGRAM

## 2021-10-07 PROCEDURE — 1159F MED LIST DOCD IN RCRD: CPT | Mod: CPTII,S$GLB,, | Performed by: STUDENT IN AN ORGANIZED HEALTH CARE EDUCATION/TRAINING PROGRAM

## 2022-03-24 ENCOUNTER — OFFICE VISIT (OUTPATIENT)
Dept: FAMILY MEDICINE | Facility: CLINIC | Age: 47
End: 2022-03-24
Payer: COMMERCIAL

## 2022-03-24 VITALS
WEIGHT: 273.5 LBS | TEMPERATURE: 98 F | BODY MASS INDEX: 46.69 KG/M2 | HEIGHT: 64 IN | SYSTOLIC BLOOD PRESSURE: 130 MMHG | DIASTOLIC BLOOD PRESSURE: 76 MMHG | HEART RATE: 102 BPM | OXYGEN SATURATION: 97 %

## 2022-03-24 DIAGNOSIS — Z12.11 COLON CANCER SCREENING: Primary | ICD-10-CM

## 2022-03-24 DIAGNOSIS — K21.9 GASTROESOPHAGEAL REFLUX DISEASE, UNSPECIFIED WHETHER ESOPHAGITIS PRESENT: ICD-10-CM

## 2022-03-24 PROCEDURE — 1160F RVW MEDS BY RX/DR IN RCRD: CPT | Mod: CPTII,S$GLB,, | Performed by: STUDENT IN AN ORGANIZED HEALTH CARE EDUCATION/TRAINING PROGRAM

## 2022-03-24 PROCEDURE — 1159F PR MEDICATION LIST DOCUMENTED IN MEDICAL RECORD: ICD-10-PCS | Mod: CPTII,S$GLB,, | Performed by: STUDENT IN AN ORGANIZED HEALTH CARE EDUCATION/TRAINING PROGRAM

## 2022-03-24 PROCEDURE — 1160F PR REVIEW ALL MEDS BY PRESCRIBER/CLIN PHARMACIST DOCUMENTED: ICD-10-PCS | Mod: CPTII,S$GLB,, | Performed by: STUDENT IN AN ORGANIZED HEALTH CARE EDUCATION/TRAINING PROGRAM

## 2022-03-24 PROCEDURE — 3008F BODY MASS INDEX DOCD: CPT | Mod: CPTII,S$GLB,, | Performed by: STUDENT IN AN ORGANIZED HEALTH CARE EDUCATION/TRAINING PROGRAM

## 2022-03-24 PROCEDURE — 1159F MED LIST DOCD IN RCRD: CPT | Mod: CPTII,S$GLB,, | Performed by: STUDENT IN AN ORGANIZED HEALTH CARE EDUCATION/TRAINING PROGRAM

## 2022-03-24 PROCEDURE — 99214 OFFICE O/P EST MOD 30 MIN: CPT | Mod: S$GLB,,, | Performed by: STUDENT IN AN ORGANIZED HEALTH CARE EDUCATION/TRAINING PROGRAM

## 2022-03-24 PROCEDURE — 3008F PR BODY MASS INDEX (BMI) DOCUMENTED: ICD-10-PCS | Mod: CPTII,S$GLB,, | Performed by: STUDENT IN AN ORGANIZED HEALTH CARE EDUCATION/TRAINING PROGRAM

## 2022-03-24 PROCEDURE — 3078F PR MOST RECENT DIASTOLIC BLOOD PRESSURE < 80 MM HG: ICD-10-PCS | Mod: CPTII,S$GLB,, | Performed by: STUDENT IN AN ORGANIZED HEALTH CARE EDUCATION/TRAINING PROGRAM

## 2022-03-24 PROCEDURE — 3078F DIAST BP <80 MM HG: CPT | Mod: CPTII,S$GLB,, | Performed by: STUDENT IN AN ORGANIZED HEALTH CARE EDUCATION/TRAINING PROGRAM

## 2022-03-24 PROCEDURE — 99214 PR OFFICE/OUTPT VISIT, EST, LEVL IV, 30-39 MIN: ICD-10-PCS | Mod: S$GLB,,, | Performed by: STUDENT IN AN ORGANIZED HEALTH CARE EDUCATION/TRAINING PROGRAM

## 2022-03-24 PROCEDURE — 3075F PR MOST RECENT SYSTOLIC BLOOD PRESS GE 130-139MM HG: ICD-10-PCS | Mod: CPTII,S$GLB,, | Performed by: STUDENT IN AN ORGANIZED HEALTH CARE EDUCATION/TRAINING PROGRAM

## 2022-03-24 PROCEDURE — 3075F SYST BP GE 130 - 139MM HG: CPT | Mod: CPTII,S$GLB,, | Performed by: STUDENT IN AN ORGANIZED HEALTH CARE EDUCATION/TRAINING PROGRAM

## 2022-03-24 RX ORDER — PANTOPRAZOLE SODIUM 40 MG/1
40 TABLET, DELAYED RELEASE ORAL EVERY MORNING
Qty: 90 TABLET | Refills: 3 | Status: ON HOLD | OUTPATIENT
Start: 2022-03-24 | End: 2022-06-10 | Stop reason: HOSPADM

## 2022-03-24 NOTE — PROGRESS NOTES
Patient ID: Manjit Silverio is a 46 y.o. male.     Chief Complaint: Gastroesophageal Reflux    Gastroesophageal Reflux  He complains of belching. He reports no abdominal pain, no chest pain, no coughing, no early satiety, no globus sensation, no heartburn, no nausea, no sore throat, no tooth decay, no water brash or no wheezing. This is a new problem. Episode onset: 2 weeks ago. The problem occurs frequently. The problem has been unchanged. The symptoms are aggravated by certain foods (drinking). Pertinent negatives include no anemia or fatigue. Risk factors include obesity. He has tried an antacid and a histamine-2 antagonist for the symptoms. The treatment provided no relief.          Review of Systems  Review of Systems   Constitutional: Negative for fatigue and fever.   HENT: Negative for ear pain, hearing loss, sinus pain and sore throat.    Eyes: Negative for discharge.   Respiratory: Negative for cough, shortness of breath and wheezing.    Cardiovascular: Negative for chest pain, palpitations and leg swelling.   Gastrointestinal: Negative for abdominal pain, blood in stool, constipation, diarrhea, heartburn, nausea and vomiting.   Genitourinary: Positive for urgency. Negative for hematuria.   Musculoskeletal: Negative for myalgias and neck pain.   Skin: Negative for rash.   Neurological: Negative for weakness and headaches.   Endo/Heme/Allergies: Negative for polydipsia.   Psychiatric/Behavioral: Negative for depression.   All other systems reviewed and are negative.      Currently Medications  Current Outpatient Medications on File Prior to Visit   Medication Sig Dispense Refill    atorvastatin (LIPITOR) 10 MG tablet Take 1 tablet (10 mg total) by mouth once daily. 90 tablet 4    losartan-hydrochlorothiazide 100-25 mg (HYZAAR) 100-25 mg per tablet Take 1 tablet by mouth once daily. 90 tablet 4     No current facility-administered medications on file prior to visit.       Physical  Exam  Vitals:     "03/24/22 1456   BP: 130/76   BP Location: Right arm   Patient Position: Sitting   Pulse: 102   Temp: 98.1 °F (36.7 °C)   SpO2: 97%   Weight: 124 kg (273 lb 7.7 oz)   Height: 5' 4" (1.626 m)      Body mass index is 46.94 kg/m².    Physical Exam  Vitals and nursing note reviewed.   Constitutional:       General: He is not in acute distress.     Appearance: He is not ill-appearing.   HENT:      Head: Normocephalic and atraumatic.      Right Ear: External ear normal.      Left Ear: External ear normal.      Nose: Nose normal.      Mouth/Throat:      Mouth: Mucous membranes are moist.   Eyes:      Extraocular Movements: Extraocular movements intact.      Conjunctiva/sclera: Conjunctivae normal.   Cardiovascular:      Rate and Rhythm: Normal rate and regular rhythm.      Pulses: Normal pulses.      Heart sounds: No murmur heard.  Pulmonary:      Effort: Pulmonary effort is normal. No respiratory distress.      Breath sounds: No wheezing.   Abdominal:      General: There is no distension.      Palpations: Abdomen is soft. There is no mass.      Tenderness: There is no abdominal tenderness.   Musculoskeletal:         General: No swelling.      Cervical back: Normal range of motion.   Skin:     Coloration: Skin is not jaundiced.      Findings: No rash.   Neurological:      General: No focal deficit present.      Mental Status: He is alert and oriented to person, place, and time.   Psychiatric:         Mood and Affect: Mood normal.         Thought Content: Thought content normal.         Labs:    Complete Blood Count  Lab Results   Component Value Date    RBC 4.92 08/13/2021    HGB 14.2 08/13/2021    HCT 43.4 08/13/2021    MCV 88 08/13/2021    MCH 28.9 08/13/2021    MCHC 32.7 08/13/2021    RDW 13.3 08/13/2021     08/13/2021    MPV 11.3 08/13/2021    GRAN 2.4 08/13/2021    GRAN 40.3 08/13/2021    LYMPH 2.8 08/13/2021    LYMPH 47.6 08/13/2021    MONO 0.6 08/13/2021    MONO 9.8 08/13/2021    EOS 0.1 08/13/2021    BASO " 0.05 08/13/2021    EOSINOPHIL 1.2 08/13/2021    BASOPHIL 0.9 08/13/2021    DIFFMETHOD Automated 08/13/2021       Comprehensive Metabolic Panel  No results found for: GLU, BUN, CREATININE, NA, K, CL, PROT, ALBUMIN, BILITOT, AST, ALKPHOS, CO2, ALT, ANIONGAP, EGFRNONAA, ESTGFRAFRICA    TSH  No results found for: TSH    Imaging:  X-Ray Knee 1 or 2 View Left  Narrative: EXAMINATION:  XR KNEE 1 OR 2 VIEW LEFT    CLINICAL HISTORY:  Pain in unspecified knee    COMPARISON:  No comparison studies are available.    FINDINGS:  Bone alignment is satisfactory.  No fracture or dislocation.  No advanced arthritic change.  No significant soft tissue findings.  Impression: No acute findings.    Electronically signed by: Lesly Kelly MD  Date:    01/09/2019  Time:    15:43      Assessment/Plan:    Problem List Items Addressed This Visit        GI    Gastroesophageal reflux disease    Relevant Medications    pantoprazole (PROTONIX) 40 MG tablet      Other Visit Diagnoses     Colon cancer screening    -  Primary    Relevant Orders    Case Request Endoscopy: COLONOSCOPY (Completed)           Discussed how to stay healthy including: diet, exercise, refraining from smoking and discussed screening exams / tests needed for age, sex and family Hx.      Fazal Forbes MD      Answers for HPI/ROS submitted by the patient on 3/23/2022  activity change: No  unexpected weight change: Yes  rhinorrhea: No  trouble swallowing: No  visual disturbance: Yes  chest tightness: No  polyuria: Yes  difficulty urinating: No  joint swelling: No  arthralgias: No  confusion: No  dysphoric mood: No

## 2022-04-11 ENCOUNTER — HOSPITAL ENCOUNTER (OUTPATIENT)
Dept: RADIOLOGY | Facility: HOSPITAL | Age: 47
Discharge: HOME | End: 2022-04-11
Attending: STUDENT IN AN ORGANIZED HEALTH CARE EDUCATION/TRAINING PROGRAM
Payer: COMMERCIAL

## 2022-04-11 ENCOUNTER — PATIENT MESSAGE (OUTPATIENT)
Dept: FAMILY MEDICINE | Facility: CLINIC | Age: 47
End: 2022-04-11

## 2022-04-11 ENCOUNTER — TELEPHONE (OUTPATIENT)
Dept: FAMILY MEDICINE | Facility: CLINIC | Age: 47
End: 2022-04-11

## 2022-04-11 ENCOUNTER — OFFICE VISIT (OUTPATIENT)
Dept: FAMILY MEDICINE | Facility: CLINIC | Age: 47
End: 2022-04-11
Payer: COMMERCIAL

## 2022-04-11 VITALS
DIASTOLIC BLOOD PRESSURE: 86 MMHG | SYSTOLIC BLOOD PRESSURE: 122 MMHG | TEMPERATURE: 98 F | WEIGHT: 272.81 LBS | BODY MASS INDEX: 46.57 KG/M2 | HEART RATE: 100 BPM | OXYGEN SATURATION: 96 % | HEIGHT: 64 IN

## 2022-04-11 DIAGNOSIS — G89.29 CHRONIC PAIN OF RIGHT KNEE: ICD-10-CM

## 2022-04-11 DIAGNOSIS — M25.561 CHRONIC PAIN OF RIGHT KNEE: ICD-10-CM

## 2022-04-11 DIAGNOSIS — G89.29 CHRONIC PAIN OF RIGHT KNEE: Primary | ICD-10-CM

## 2022-04-11 DIAGNOSIS — S83.209A CURRENT TEAR OF MENISCUS OF KNEE, UNSPECIFIED LATERALITY, UNSPECIFIED MENISCUS, UNSPECIFIED TEAR TYPE, INITIAL ENCOUNTER: Primary | ICD-10-CM

## 2022-04-11 DIAGNOSIS — M25.561 CHRONIC PAIN OF RIGHT KNEE: Primary | ICD-10-CM

## 2022-04-11 DIAGNOSIS — Z12.11 COLON CANCER SCREENING: ICD-10-CM

## 2022-04-11 PROCEDURE — 3074F SYST BP LT 130 MM HG: CPT | Mod: CPTII,S$GLB,, | Performed by: STUDENT IN AN ORGANIZED HEALTH CARE EDUCATION/TRAINING PROGRAM

## 2022-04-11 PROCEDURE — 3079F PR MOST RECENT DIASTOLIC BLOOD PRESSURE 80-89 MM HG: ICD-10-PCS | Mod: CPTII,S$GLB,, | Performed by: STUDENT IN AN ORGANIZED HEALTH CARE EDUCATION/TRAINING PROGRAM

## 2022-04-11 PROCEDURE — 3074F PR MOST RECENT SYSTOLIC BLOOD PRESSURE < 130 MM HG: ICD-10-PCS | Mod: CPTII,S$GLB,, | Performed by: STUDENT IN AN ORGANIZED HEALTH CARE EDUCATION/TRAINING PROGRAM

## 2022-04-11 PROCEDURE — 3008F PR BODY MASS INDEX (BMI) DOCUMENTED: ICD-10-PCS | Mod: CPTII,S$GLB,, | Performed by: STUDENT IN AN ORGANIZED HEALTH CARE EDUCATION/TRAINING PROGRAM

## 2022-04-11 PROCEDURE — 99214 PR OFFICE/OUTPT VISIT, EST, LEVL IV, 30-39 MIN: ICD-10-PCS | Mod: S$GLB,,, | Performed by: STUDENT IN AN ORGANIZED HEALTH CARE EDUCATION/TRAINING PROGRAM

## 2022-04-11 PROCEDURE — 1160F PR REVIEW ALL MEDS BY PRESCRIBER/CLIN PHARMACIST DOCUMENTED: ICD-10-PCS | Mod: CPTII,S$GLB,, | Performed by: STUDENT IN AN ORGANIZED HEALTH CARE EDUCATION/TRAINING PROGRAM

## 2022-04-11 PROCEDURE — 1160F RVW MEDS BY RX/DR IN RCRD: CPT | Mod: CPTII,S$GLB,, | Performed by: STUDENT IN AN ORGANIZED HEALTH CARE EDUCATION/TRAINING PROGRAM

## 2022-04-11 PROCEDURE — 3008F BODY MASS INDEX DOCD: CPT | Mod: CPTII,S$GLB,, | Performed by: STUDENT IN AN ORGANIZED HEALTH CARE EDUCATION/TRAINING PROGRAM

## 2022-04-11 PROCEDURE — 73562 X-RAY EXAM OF KNEE 3: CPT | Mod: TC,FY,PO,RT

## 2022-04-11 PROCEDURE — 3079F DIAST BP 80-89 MM HG: CPT | Mod: CPTII,S$GLB,, | Performed by: STUDENT IN AN ORGANIZED HEALTH CARE EDUCATION/TRAINING PROGRAM

## 2022-04-11 PROCEDURE — 99214 OFFICE O/P EST MOD 30 MIN: CPT | Mod: S$GLB,,, | Performed by: STUDENT IN AN ORGANIZED HEALTH CARE EDUCATION/TRAINING PROGRAM

## 2022-04-11 PROCEDURE — 1159F MED LIST DOCD IN RCRD: CPT | Mod: CPTII,S$GLB,, | Performed by: STUDENT IN AN ORGANIZED HEALTH CARE EDUCATION/TRAINING PROGRAM

## 2022-04-11 PROCEDURE — 1159F PR MEDICATION LIST DOCUMENTED IN MEDICAL RECORD: ICD-10-PCS | Mod: CPTII,S$GLB,, | Performed by: STUDENT IN AN ORGANIZED HEALTH CARE EDUCATION/TRAINING PROGRAM

## 2022-04-11 RX ORDER — METHYLPREDNISOLONE 4 MG/1
TABLET ORAL
Qty: 21 EACH | Refills: 0 | Status: SHIPPED | OUTPATIENT
Start: 2022-04-11 | End: 2022-05-02

## 2022-04-11 NOTE — PROGRESS NOTES
Patient ID: Manjit Silverio is a 46 y.o. male.     Chief Complaint: Knee Pain    Knee Pain   There was no injury mechanism. The pain is present in the right knee. The quality of the pain is described as aching. The pain is at a severity of 7/10. The pain is severe. The pain has been constant since onset. Associated symptoms include an inability to bear weight. Pertinent negatives include no loss of motion, loss of sensation, muscle weakness, numbness or tingling. The symptoms are aggravated by weight bearing and movement. He has tried acetaminophen and NSAIDs for the symptoms. The treatment provided no relief.   patient with continue right knee pain. This has been present since OCt 2021. He has been treating the pain with ibuprofen and tylenol. The pain has acutely worsened over the last week. He is having difficulty walking on the knee.        Review of Systems  Review of Systems   HENT: Negative for hearing loss.    Eyes: Negative for discharge.   Respiratory: Negative for wheezing.    Cardiovascular: Negative for chest pain and palpitations.   Gastrointestinal: Negative for blood in stool, constipation, diarrhea and vomiting.   Genitourinary: Negative for hematuria and urgency.   Neurological: Negative for tingling, weakness, numbness and headaches.   Endo/Heme/Allergies: Negative for polydipsia.       Currently Medications  Current Outpatient Medications on File Prior to Visit   Medication Sig Dispense Refill    atorvastatin (LIPITOR) 10 MG tablet Take 1 tablet (10 mg total) by mouth once daily. 90 tablet 4    losartan-hydrochlorothiazide 100-25 mg (HYZAAR) 100-25 mg per tablet Take 1 tablet by mouth once daily. 90 tablet 4    pantoprazole (PROTONIX) 40 MG tablet Take 1 tablet (40 mg total) by mouth every morning. 90 tablet 3     No current facility-administered medications on file prior to visit.       Physical  Exam  Vitals:    04/11/22 1123   BP: 122/86   BP Location: Right arm   Patient Position: Sitting  "  Pulse: 100   Temp: 98.2 °F (36.8 °C)   SpO2: 96%   Weight: 123.8 kg (272 lb 13.1 oz)   Height: 5' 4" (1.626 m)      Body mass index is 46.83 kg/m².    Physical Exam    Labs:    Complete Blood Count  Lab Results   Component Value Date    RBC 4.92 08/13/2021    HGB 14.2 08/13/2021    HCT 43.4 08/13/2021    MCV 88 08/13/2021    MCH 28.9 08/13/2021    MCHC 32.7 08/13/2021    RDW 13.3 08/13/2021     08/13/2021    MPV 11.3 08/13/2021    GRAN 2.4 08/13/2021    GRAN 40.3 08/13/2021    LYMPH 2.8 08/13/2021    LYMPH 47.6 08/13/2021    MONO 0.6 08/13/2021    MONO 9.8 08/13/2021    EOS 0.1 08/13/2021    BASO 0.05 08/13/2021    EOSINOPHIL 1.2 08/13/2021    BASOPHIL 0.9 08/13/2021    DIFFMETHOD Automated 08/13/2021       Comprehensive Metabolic Panel  No results found for: GLU, BUN, CREATININE, NA, K, CL, PROT, ALBUMIN, BILITOT, AST, ALKPHOS, CO2, ALT, ANIONGAP, EGFRNONAA, ESTGFRAFRICA    TSH  No results found for: TSH    Imaging:  X-Ray Knee 3 View Right  Narrative: EXAMINATION:  XR KNEE 3 VIEW RIGHT    CLINICAL HISTORY:  Pain in right knee    TECHNIQUE:  AP, lateral, and Merchant views of the right knee were performed.    COMPARISON:  None    FINDINGS:  Mild medial compartment narrowing.  No acute fracture or dislocation.  Suggestion of a moderate suprapatellar knee joint effusion.  Impression: Mild medial compartment narrowing. No acute fracture or dislocation. Suggestion of a moderate suprapatellar knee joint effusion.    Electronically signed by: Jaden Peterson  Date:    04/11/2022  Time:    12:24      Assessment/Plan:    Problem List Items Addressed This Visit    None     Visit Diagnoses     Chronic pain of right knee    -  Primary    Relevant Orders    X-Ray Knee 3 View Right (Completed)    Colon cancer screening        Relevant Orders    Case Request Endoscopy: COLONOSCOPY (Completed)           Discussed how to stay healthy including: diet, exercise, refraining from smoking and discussed screening exams / tests " needed for age, sex and family Hx.        Fazal Forbes MD      Answers for HPI/ROS submitted by the patient on 4/11/2022  activity change: No  unexpected weight change: No  rhinorrhea: No  trouble swallowing: No  visual disturbance: No  chest tightness: No  polyuria: No  difficulty urinating: No  joint swelling: No  arthralgias: Yes  confusion: No  dysphoric mood: No

## 2022-04-11 NOTE — TELEPHONE ENCOUNTER
----- Message from Mary Ellen Gray sent at 4/11/2022  2:31 PM CDT -----  Regarding: Lab Results & F/U Appt Inquiry  Type:  Test Results    Who Called: pt  Name of Test (Lab/Mammo/Etc):  X Ray for Knee  Date of Test: 04-11-22  Ordering Provider: Leidy  Where the test was performed: [facility]  Would the patient rather a call back or a response via [facility]? call  Best Call Back Number:  [phone]

## 2022-04-18 ENCOUNTER — HOSPITAL ENCOUNTER (OUTPATIENT)
Dept: RADIOLOGY | Facility: HOSPITAL | Age: 47
Discharge: HOME | End: 2022-04-18
Attending: STUDENT IN AN ORGANIZED HEALTH CARE EDUCATION/TRAINING PROGRAM
Payer: COMMERCIAL

## 2022-04-18 ENCOUNTER — TELEPHONE (OUTPATIENT)
Dept: ADMINISTRATIVE | Facility: HOSPITAL | Age: 47
End: 2022-04-18
Payer: COMMERCIAL

## 2022-04-18 DIAGNOSIS — Z12.11 COLON CANCER SCREENING: Primary | ICD-10-CM

## 2022-04-18 DIAGNOSIS — M25.561 CHRONIC PAIN OF RIGHT KNEE: ICD-10-CM

## 2022-04-18 DIAGNOSIS — G89.29 CHRONIC PAIN OF RIGHT KNEE: ICD-10-CM

## 2022-04-18 PROCEDURE — 73721 MRI JNT OF LWR EXTRE W/O DYE: CPT | Mod: TC,PO,RT

## 2022-04-19 ENCOUNTER — PATIENT MESSAGE (OUTPATIENT)
Dept: FAMILY MEDICINE | Facility: CLINIC | Age: 47
End: 2022-04-19
Payer: COMMERCIAL

## 2022-04-19 DIAGNOSIS — S83.209A CURRENT TEAR OF MENISCUS OF KNEE, UNSPECIFIED LATERALITY, UNSPECIFIED MENISCUS, UNSPECIFIED TEAR TYPE, INITIAL ENCOUNTER: Primary | ICD-10-CM

## 2022-04-20 ENCOUNTER — PATIENT MESSAGE (OUTPATIENT)
Dept: FAMILY MEDICINE | Facility: CLINIC | Age: 47
End: 2022-04-20
Payer: COMMERCIAL

## 2022-04-20 ENCOUNTER — TELEPHONE (OUTPATIENT)
Dept: SPORTS MEDICINE | Facility: CLINIC | Age: 47
End: 2022-04-20
Payer: COMMERCIAL

## 2022-04-20 DIAGNOSIS — M25.561 RIGHT KNEE PAIN, UNSPECIFIED CHRONICITY: Primary | ICD-10-CM

## 2022-04-21 ENCOUNTER — PATIENT MESSAGE (OUTPATIENT)
Dept: FAMILY MEDICINE | Facility: CLINIC | Age: 47
End: 2022-04-21
Payer: COMMERCIAL

## 2022-04-26 ENCOUNTER — OFFICE VISIT (OUTPATIENT)
Dept: SPORTS MEDICINE | Facility: CLINIC | Age: 47
End: 2022-04-26
Payer: COMMERCIAL

## 2022-04-26 VITALS
DIASTOLIC BLOOD PRESSURE: 83 MMHG | SYSTOLIC BLOOD PRESSURE: 121 MMHG | WEIGHT: 275 LBS | HEART RATE: 85 BPM | BODY MASS INDEX: 46.95 KG/M2 | HEIGHT: 64 IN

## 2022-04-26 DIAGNOSIS — S83.241A OTHER TEAR OF MEDIAL MENISCUS OF RIGHT KNEE AS CURRENT INJURY, INITIAL ENCOUNTER: Primary | ICD-10-CM

## 2022-04-26 PROCEDURE — 1159F PR MEDICATION LIST DOCUMENTED IN MEDICAL RECORD: ICD-10-PCS | Mod: CPTII,S$GLB,, | Performed by: STUDENT IN AN ORGANIZED HEALTH CARE EDUCATION/TRAINING PROGRAM

## 2022-04-26 PROCEDURE — 99204 OFFICE O/P NEW MOD 45 MIN: CPT | Mod: S$GLB,,, | Performed by: STUDENT IN AN ORGANIZED HEALTH CARE EDUCATION/TRAINING PROGRAM

## 2022-04-26 PROCEDURE — 3079F DIAST BP 80-89 MM HG: CPT | Mod: CPTII,S$GLB,, | Performed by: STUDENT IN AN ORGANIZED HEALTH CARE EDUCATION/TRAINING PROGRAM

## 2022-04-26 PROCEDURE — 3074F PR MOST RECENT SYSTOLIC BLOOD PRESSURE < 130 MM HG: ICD-10-PCS | Mod: CPTII,S$GLB,, | Performed by: STUDENT IN AN ORGANIZED HEALTH CARE EDUCATION/TRAINING PROGRAM

## 2022-04-26 PROCEDURE — 3079F PR MOST RECENT DIASTOLIC BLOOD PRESSURE 80-89 MM HG: ICD-10-PCS | Mod: CPTII,S$GLB,, | Performed by: STUDENT IN AN ORGANIZED HEALTH CARE EDUCATION/TRAINING PROGRAM

## 2022-04-26 PROCEDURE — 1159F MED LIST DOCD IN RCRD: CPT | Mod: CPTII,S$GLB,, | Performed by: STUDENT IN AN ORGANIZED HEALTH CARE EDUCATION/TRAINING PROGRAM

## 2022-04-26 PROCEDURE — 99999 PR PBB SHADOW E&M-EST. PATIENT-LVL IV: ICD-10-PCS | Mod: PBBFAC,,, | Performed by: STUDENT IN AN ORGANIZED HEALTH CARE EDUCATION/TRAINING PROGRAM

## 2022-04-26 PROCEDURE — 1160F PR REVIEW ALL MEDS BY PRESCRIBER/CLIN PHARMACIST DOCUMENTED: ICD-10-PCS | Mod: CPTII,S$GLB,, | Performed by: STUDENT IN AN ORGANIZED HEALTH CARE EDUCATION/TRAINING PROGRAM

## 2022-04-26 PROCEDURE — 3074F SYST BP LT 130 MM HG: CPT | Mod: CPTII,S$GLB,, | Performed by: STUDENT IN AN ORGANIZED HEALTH CARE EDUCATION/TRAINING PROGRAM

## 2022-04-26 PROCEDURE — 99204 PR OFFICE/OUTPT VISIT, NEW, LEVL IV, 45-59 MIN: ICD-10-PCS | Mod: S$GLB,,, | Performed by: STUDENT IN AN ORGANIZED HEALTH CARE EDUCATION/TRAINING PROGRAM

## 2022-04-26 PROCEDURE — 3008F PR BODY MASS INDEX (BMI) DOCUMENTED: ICD-10-PCS | Mod: CPTII,S$GLB,, | Performed by: STUDENT IN AN ORGANIZED HEALTH CARE EDUCATION/TRAINING PROGRAM

## 2022-04-26 PROCEDURE — 3008F BODY MASS INDEX DOCD: CPT | Mod: CPTII,S$GLB,, | Performed by: STUDENT IN AN ORGANIZED HEALTH CARE EDUCATION/TRAINING PROGRAM

## 2022-04-26 PROCEDURE — 99999 PR PBB SHADOW E&M-EST. PATIENT-LVL IV: CPT | Mod: PBBFAC,,, | Performed by: STUDENT IN AN ORGANIZED HEALTH CARE EDUCATION/TRAINING PROGRAM

## 2022-04-26 PROCEDURE — 1160F RVW MEDS BY RX/DR IN RCRD: CPT | Mod: CPTII,S$GLB,, | Performed by: STUDENT IN AN ORGANIZED HEALTH CARE EDUCATION/TRAINING PROGRAM

## 2022-04-26 NOTE — PROGRESS NOTES
Subjective:          Chief Complaint: Manjit Silverio is a 46 y.o. male who had concerns including Pain of the Right Knee.    Manjit Silverio is a 46 y.o. male  presents for evaluation of his right knee.  He has had pain for nearly 1.5 weeks without any known inciting injury, event or trauma.  The pain began as being anterior and of the patella, but is now along the medial joint line extending somewhat posteriorly.  The pain increases with extended periods of weight-bearing as well as with his knees flexed.  This makes climbing in and out of his truck difficult.  He does state he has some vague and mild aches to his knee prior to this.  He denies any mechanical symptoms such as catching locking.  Denies any feelings of instability.  He states that his knee does occasionally feel stiff.    Past Medical History:   Diagnosis Date    Hypertension        Current Outpatient Medications on File Prior to Visit   Medication Sig Dispense Refill    atorvastatin (LIPITOR) 10 MG tablet Take 1 tablet (10 mg total) by mouth once daily. 90 tablet 4    losartan-hydrochlorothiazide 100-25 mg (HYZAAR) 100-25 mg per tablet Take 1 tablet by mouth once daily. 90 tablet 4    methylPREDNISolone (MEDROL DOSEPACK) 4 mg tablet use as directed (Patient not taking: Reported on 4/26/2022) 21 each 0    pantoprazole (PROTONIX) 40 MG tablet Take 1 tablet (40 mg total) by mouth every morning. (Patient not taking: Reported on 4/26/2022) 90 tablet 3     No current facility-administered medications on file prior to visit.       History reviewed. No pertinent surgical history.    Family History   Problem Relation Age of Onset    Stroke Father        Social History     Socioeconomic History    Marital status:    Tobacco Use    Smoking status: Never Smoker    Smokeless tobacco: Never Used   Substance and Sexual Activity    Alcohol use: No     Social Determinants of Health     Financial Resource Strain: Low Risk     Difficulty  of Paying Living Expenses: Not hard at all   Food Insecurity: No Food Insecurity    Worried About Running Out of Food in the Last Year: Never true    Ran Out of Food in the Last Year: Never true   Transportation Needs: No Transportation Needs    Lack of Transportation (Medical): No    Lack of Transportation (Non-Medical): No   Physical Activity: Inactive    Days of Exercise per Week: 0 days    Minutes of Exercise per Session: 0 min   Stress: No Stress Concern Present    Feeling of Stress : Not at all   Social Connections: Unknown    Frequency of Communication with Friends and Family: More than three times a week    Frequency of Social Gatherings with Friends and Family: Never    Active Member of Clubs or Organizations: No    Attends Club or Organization Meetings: Never    Marital Status:    Housing Stability: Low Risk     Unable to Pay for Housing in the Last Year: No    Number of Places Lived in the Last Year: 1    Unstable Housing in the Last Year: No       Review of Systems   Constitutional: Negative.   HENT: Negative.    Eyes: Negative.    Cardiovascular: Negative.    Respiratory: Negative.    Endocrine: Negative.    Hematologic/Lymphatic: Negative.    Skin: Negative.    Musculoskeletal: Positive for arthritis, joint pain, joint swelling and stiffness. Negative for back pain, falls and muscle weakness.   Neurological: Negative.    Psychiatric/Behavioral: Negative.    Allergic/Immunologic: Negative.                    Objective:        General: Manjit is well-developed, well-nourished, appears stated age, in no acute distress, alert and oriented to time, place and person.     General    Nursing note and vitals reviewed.  Constitutional: He is oriented to person, place, and time. He appears well-developed and well-nourished. No distress.   HENT:   Head: Normocephalic and atraumatic.   Nose: Nose normal.   Eyes: EOM are normal.   Cardiovascular: Intact distal pulses.    Pulmonary/Chest: Effort  normal. No respiratory distress.   Neurological: He is alert and oriented to person, place, and time.   Psychiatric: He has a normal mood and affect. His behavior is normal. Judgment and thought content normal.     General Musculoskeletal Exam   Gait: normal       Right Knee Exam     Inspection   Erythema: absent  Scars: absent  Swelling: absent  Effusion: absent  Deformity: absent  Bruising: absent    Tenderness   The patient is tender to palpation of the medial joint line.    Crepitus   The patient has crepitus of the patella.    Range of Motion   Extension: -5   Flexion: 130     Tests   Meniscus   Vinnie:  Medial - negative Lateral - negative  Ligament Examination Lachman: normal (-1 to 2mm) PCL-Posterior Drawer: normal (0 to 2mm)     MCL - Valgus: normal (0 to 2mm)  LCL - Varus: normal  Patella   Patellar apprehension: negative  Passive Patellar Tilt: neutral  Patellar Tracking: normal  Patellar Grind: positive    Other   Sensation: normal    Left Knee Exam     Inspection   Erythema: absent  Scars: absent  Swelling: absent  Effusion: absent  Deformity: absent  Bruising: absent    Tenderness   The patient is experiencing no tenderness.     Crepitus   The patient has crepitus of the patella.    Range of Motion   Extension: -5   Flexion: 130     Tests   Meniscus   Vinnie:  Medial - negative Lateral - negative  Stability Lachman: normal (-1 to 2mm) PCL-Posterior Drawer: normal (0 to 2mm)  MCL - Valgus: normal (0 to 2mm)  LCL - Varus: normal (0 to 2mm)  Patella   Patellar apprehension: negative  Passive Patellar Tilt: neutral  Patellar Tracking: normal  Patellar Grind: positive    Other   Sensation: normal    Muscle Strength   Right Lower Extremity   Quadriceps:  5/5   Hamstrin/5   Left Lower Extremity   Quadriceps:  5/5   Hamstrin/5     Vascular Exam     Right Pulses  Dorsalis Pedis:      2+  Posterior Tibial:      2+        Left Pulses  Dorsalis Pedis:      2+  Posterior Tibial:       2+        Edema  Right Lower Leg: absent  Left Lower Leg: absent    Imaging:  X-rays of the bilateral knees from 04/26/2022 personally reviewed by me on that day.  This could weight-bearing AP, PA flexion, lateral, Merchant views.  There is mild arthritic change of the bilateral medial compartments.  The patellofemoral Homans bilaterally has mild-to-moderate arthritic changes most severe on the left with osteophyte formation.  Overall, the joint spaces in all 3 compartments are preserved.    MRI the right knee from 04/18/2022 personally reviewed by me 04/26/2022.  There was a radial tear of the posterior horn of medial meniscus.  There is no extrusion of the meniscus in the gutter.  The root of the medial meniscus is intact.  Lateral meniscus is intact.  The ACL, PCL and collateral ligaments are intact.  There is high-grade partial to full-thickness chondromalacia in the medial and patellofemoral compartments.          Assessment:     Manjit Silverio is a 46 y.o. male with right knee medial meniscus tear as well as chondromalacia  Encounter Diagnosis   Name Primary?    Other tear of medial meniscus of right knee as current injury, initial encounter Yes          Plan:         Diagnosis and treatment options discussed at length with the patient all his questions were answered.  I showed him the x-rays in the MRI and explained the findings to him.  We discussed treatment options for the meniscal tear.  This included non operative with a course of physical therapy.  Additionally, we discussed injections.  This included corticosteroid injections and the risks and benefits.  After discussion we elected proceed with a corticosteroid injection as well as a course of physical therapy.  He will return to clinic in 2 months for repeat evaluation.  If he does not have significant relief of symptoms we can discuss operative intervention.  I explained that this would entail arthroscopic partial medial meniscectomy.    All of  their questions were answered.  They will call the clinic with any questions or concerns in the interim.    Should the patient's symptoms worsen, persist, or fail to improve they should return for reevaluation and I would be happy to see them back anytime.        Barrington Copeland M.D.     Please be aware that this note has been generated with the assistance of [employer] voice-to-text.  Please excuse any spelling or grammatical errors.    Thank you for choosing Dr. Barrington Copeland for your sports medicine care. It is our goal to provide you with exceptional care that will help keep you healthy, active, and get you back in the game.     If you felt that you received exemplary care today, please consider leaving feedback for Dr. Copeland on [email or web address] at [email or web address].    Please do not hesitate to reach out to us via email, phone, or MyChart with any questions, concerns, or feedback.

## 2022-04-28 ENCOUNTER — CLINICAL SUPPORT (OUTPATIENT)
Dept: REHABILITATION | Facility: HOSPITAL | Age: 47
End: 2022-04-28
Payer: COMMERCIAL

## 2022-04-28 DIAGNOSIS — R29.898 DECREASED STRENGTH OF LOWER EXTREMITY: ICD-10-CM

## 2022-04-28 DIAGNOSIS — S83.241A OTHER TEAR OF MEDIAL MENISCUS OF RIGHT KNEE AS CURRENT INJURY, INITIAL ENCOUNTER: ICD-10-CM

## 2022-04-28 PROCEDURE — 97161 PT EVAL LOW COMPLEX 20 MIN: CPT | Mod: PO

## 2022-05-02 ENCOUNTER — CLINICAL SUPPORT (OUTPATIENT)
Dept: REHABILITATION | Facility: HOSPITAL | Age: 47
End: 2022-05-02
Payer: COMMERCIAL

## 2022-05-02 ENCOUNTER — HOSPITAL ENCOUNTER (OUTPATIENT)
Dept: PREADMISSION TESTING | Facility: HOSPITAL | Age: 47
Discharge: HOME | End: 2022-05-02
Attending: STUDENT IN AN ORGANIZED HEALTH CARE EDUCATION/TRAINING PROGRAM
Payer: COMMERCIAL

## 2022-05-02 DIAGNOSIS — R29.898 DECREASED STRENGTH OF LOWER EXTREMITY: Primary | ICD-10-CM

## 2022-05-02 DIAGNOSIS — Z12.11 COLON CANCER SCREENING: Primary | ICD-10-CM

## 2022-05-02 PROCEDURE — 97110 THERAPEUTIC EXERCISES: CPT | Mod: PO

## 2022-05-02 RX ORDER — SODIUM, POTASSIUM,MAG SULFATES 17.5-3.13G
1 SOLUTION, RECONSTITUTED, ORAL ORAL DAILY
Qty: 1 KIT | Refills: 0 | Status: SHIPPED | OUTPATIENT
Start: 2022-05-02 | End: 2022-05-04

## 2022-05-02 NOTE — PROGRESS NOTES
"  Physical Therapy Treatment Note     Name: Manjit Silverio  Clinic Number: [id number]    Therapy Diagnosis:   Encounter Diagnosis   Name Primary?    Decreased strength of lower extremity Yes     Physician: Barrington Copeland MD    Visit Date: 5/2/2022    Physician Orders: PT Eval and Treat Quad+VMO Strengthening  Medical Diagnosis from Referral: S83.241A (ICD-10-CM) - Other tear of medial meniscus of right knee as current injury, initial encounter  Evaluation Date: 4/28/2022  Authorization Period Expiration: 12/31/2022  Plan of Care Expiration: 6/28/2022  Progress Note Due: 5/28/2022  Visit # / Visits authorized: 1/ 20   FOTO: 1/5    Time In: 8:45 am  Time Out: 9:30 am  Total Billable Time: 45 minutes    Precautions: Standard    Subjective     Pt reports: I am feeling good, and I am not feeling pain today. I only had pain that one time when my time when my knee gave out on me  He was compliant with home exercise program.  Response to previous treatment: none  Functional change: none    Pain: 0/10  Location: R knee    Objective     Manjit received therapeutic exercises to develop strength and ROM for 45 minutes including:      Date 5/2/2022   Visit 1/20   POC exp 6/28/2022   PN 5/28/2022   FTF 5/28/2022   FOTO 1/5       Bike    Mat:    HS str 3 x 30" B   Gastroc Str. 2 x 30" B   SAQ 2 x 10 B w 1.5#   Bridges 3 x 10   SLR 2 x 10   SL Abd 2 x 10   Heel Slides    Seated:    LAQs    Seated Hip Flex    Shuttle  Leg Press    Standing:    TKE    Hip Abd    Hip Flex    Hip Ext    HS Curls    Step Ups    Step Downs    Seen by: EG          Home Exercises Provided and Patient Education Provided     Education provided:   - importance of the consistent performance of HEP    Written Home Exercises Provided: Patient instructed to cont prior HEP.  Exercises were reviewed and Manjit was able to demonstrate them prior to the end of the session.  Manjit demonstrated good  understanding of the education provided.       Assessment "     Manjit presents to therapy without any LOB and stating that he is not experiencing any pain. He tolerates today's session well without symptom provocation. He expresses that he feels challenged by prescribed activities at the completion of this session. Manjit requires moderate VCs and TCs for proper performance of prescribed activities with appropriate body mechanics and for postural awareness.    Manjit Is progressing well towards his goals.   Pt prognosis is Good.     Pt will continue to benefit from skilled outpatient physical therapy to address the deficits listed in the problem list box on initial evaluation, provide pt/family education and to maximize pt's level of independence in the home and community environment.     Pt's spiritual, cultural and educational needs considered and pt agreeable to plan of care and goals.     Anticipated barriers to physical therapy: None    Goals:  Short Term Goals: 4 weeks   1. Pt will be compliant with HEP to promote independent management of current diagnosis. In progress, not met  2. Pt will have improved B knee strength to 5/5 to improve ability to lift and carry grocery bags. In progress, not met  3. Pt will report subjective pain ratings no greater than 6/10 with ADLs. In progress, not met     Long Term Goals: 8 weeks   1. Pt will have reduced FOTO limitation score from 49% to no greater than 30% to indicate improved functional mobility. In progress, not met  2. Pt will have improved B hip strength by at least 1/2 grade to improve ability to perform sit to stand transfers without UE assist. In progress, not met  3. Pt will be able to ascend/descend ladder into his truck with no pain to return to his work responsibilities. In progress, not met  4. Pt will report subjective pain ratings no greater than 2/10 with ADLs. In progress, not met    Plan     Continue with PT POC and progress as tolerated.     Princess Carrasco, PT

## 2022-05-04 PROBLEM — R29.898 DECREASED STRENGTH OF LOWER EXTREMITY: Status: ACTIVE | Noted: 2022-05-04

## 2022-05-04 NOTE — PLAN OF CARE
[facility]   Physical Therapy Initial Evaluation     Date: 4/28/2022   Name: Manjit Silverio  [facility] Number: [id number]    Therapy Diagnosis:   Encounter Diagnoses   Name Primary?    Other tear of medial meniscus of right knee as current injury, initial encounter     Decreased strength of lower extremity      Physician: Barrington Copeland MD    Physician Orders: PT Eval and Treat Quad+VMO Strengthening  Medical Diagnosis from Referral: S83.241A (ICD-10-CM) - Other tear of medial meniscus of right knee as current injury, initial encounter  Evaluation Date: 4/28/2022  Authorization Period Expiration: 12/31/2022  Plan of Care Expiration: 6/28/2022  Progress Note Due: 5/28/2022  Visit # / Visits authorized: 1/ 1   FOTO: 0/5    Precautions: Standard     Time In: 11:15  Time Out: 12:00  Total Appointment Time (timed & untimed codes): 45 minutes      SUBJECTIVE     Date of onset: has been hurting for months, but 2 weeks ago he couldn't bend his knee    History of current condition - Manjit reports: hasn't noticed any change in schedule. No history of injury to legs/hips/back. Insidious onset of knee pain. When his knee gave out on him, it was excruciating pain. MD gave him the cortisone pills and his knee hasn't hurt much since then. Has trouble getting up and down ladder into truck, has trouble sitting for long periods of time. 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 No catching or locking experienced. No numbness/tingling or sensation changes that he has noticed.     Falls: denies falls, but knee gave out on him recently while walking    Imaging, X-Ray Knee: FINDINGS:  Right knee: There is a probable small persistent joint effusion present which appears somewhat improved from prior.  No acute fractures or dislocations visualized.  Small marginal osteophytes are present in the patellofemoral and medial compartments with  otherwise preserved joint spaces.     Left knee:  There is no radiographic evidence of acute osseous, articular, or soft tissue abnormality. Small tricompartmental marginal osteophytes are present with slight joint space narrowing in the medial compartment.    MRI: Knee Impression:     Partial-thickness radial tear at the posterior horn of the medial meniscus.     Moderate patellar chondrosis.     Moderate medial tibiofemoral chondrosis with small areas of subchondral edema at the weight-bearing femoral condyle.     Patellar instability findings such as moderate lateral patellar subluxation, shallow trochlear groove, and abnormal TT-TG distance.  No evidence of recent transient dislocation.     Moderate focal edema in the superolateral aspect of Hoffa's fat pad which can be seen in the setting of patellar tendon lateral femoral condyle friction syndrome.     Small effusion.    Prior Therapy: none  Social History: single story house.   Occupation:   Prior Level of Function: independent  Current Level of Function: mod I    Pain:  Current 0/10, worst unable to assess/10, best 0/10   Location: right knee   Description: throbbing & sharp  Aggravating Factors: Bending and Walking  Easing Factors: nothing    Patients goals: reduce pain, improve mobility     Medical History:   Past Medical History:   Diagnosis Date    Hypertension        Surgical History:   Manjit Silverio  has no past surgical history on file.    Medications:   Manjit has a current medication list which includes the following prescription(s): atorvastatin, losartan-hydrochlorothiazide 100-25 mg, pantoprazole, and suprep bowel prep kit.    Allergies:   Review of patient's allergies indicates:  No Known Allergies       OBJECTIVE     Observation: normal gait pattern    Range of Motion:   Knee Left active Left Passive Right Active R passive   Flexion 120 127 120 120   Extension 0 +2 0 +2       Lower Extremity Strength  Right LE  Left LE    Knee  extension: 4+/5 Knee extension: 4+/5   Knee flexion: 4+/5 Knee flexion: 4+/5   Hip flexion: 4+/5 Hip flexion: 4+/5   Hip extension:  4-/5 Hip extension: 4-/5   Hip abduction: 4-/5 Hip abduction: 4-/5   Hip adduction: NT Hip adduction NT   Ankle dorsiflexion: 5/5 Ankle dorsiflexion: 5/5   Ankle plantarflexion: NT Ankle plantarflexion: NT       Special Tests:    Left Right   Valgus Stress Test Negative  Negative    Varus Stress test Negative  Negative    Lachman's test Negative  Negative    Posterior Lachman Negative  Negative    Larry's Test Negative  Negative    Thessaly's Test Negative  Negative      Step down test: valgus B, decreased balance but good eccentric quad control.      Function:    - SLS R: 20 sec  - SLS L: 20 sec  - Squat: weight shifted slightly more to the L LE  - Sit <--> Stand:B UE assist   - Bed Mobility: independent    Joint Mobility: equal  Patellar mobility B    Palpation: no TTP noted    Sensation: light touch grossly intact    Flexibility: 35-40 deg SLR R- indicating hamstring tightness, 75-80 deg L      Limitation/Restriction for FOTO Knee Survey    Therapist reviewed FOTO scores for Manjit Silverio on 4/28/2022.   FOTO documents entered into [employer] - see Media section.    Limitation Score: 49%         TREATMENT     Total Treatment time (time-based codes) separate from Evaluation: 5 minutes      Manjit received the treatments listed below:      therapeutic exercises to develop strength for 5 minutes including: HEP instruction  SLR & SL hip abd      PATIENT EDUCATION AND HOME EXERCISES     Education provided:   - role of PT and goals for therapy  - importance of continuing HEP    Written Home Exercises Provided: yes. Exercises were reviewed and Manjit was able to demonstrate them prior to the end of the session.  Manjit demonstrated good  understanding of the education provided. See EMR under Patient Instructions for exercises provided during therapy sessions.    ASSESSMENT     Manjit is a 46  y.o. male referred to outpatient Physical Therapy with a medical diagnosis of Other tear of medial meniscus of right knee as current injury, initial encounter. Patient presents to the clinic with a chief complaint of knee pain, primarily when he has to climb the ladder into and out of his truck. He was noted to have similar ROM and strength B, no positive ligamentous testing, and good balance. 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 These impairments are impacting his ability to perform his work responsibilities and he would benefit from skilled physical therapy to address these impairments and improve functional mobility to return him to his prior level of function.     Patient prognosis is Good.   Patient will benefit from skilled outpatient Physical Therapy to address the deficits stated above and in the chart below, provide patient /family education, and to maximize patientt's level of independence.     Plan of care discussed with patient: Yes  Patient's spiritual, cultural and educational needs considered and patient is agreeable to the plan of care and goals as stated below:     Anticipated Barriers for therapy: none    Medical Necessity is demonstrated by the following  History  Co-morbidities and personal factors that may impact the plan of care Co-morbidities:   HTN    Personal Factors:   no deficits     low   Examination  Body Structures and Functions, activity limitations and participation restrictions that may impact the plan of care Body Regions:   lower extremities    Body Systems:    functional strength and mobility    Participation Restrictions:   Difficulty climbing up and down ladder into truck at work    Activity limitations:   Learning and applying knowledge  no deficits    General Tasks and Commands  no deficits    Communication  no deficits    Mobility  climbing  ladders    Self care  no deficits    Domestic Life  no deficits    Interactions/Relationships  no deficits    Life Areas  employment    Community and Social Life  no deficits         low   Clinical Presentation stable and uncomplicated low   Decision Making/ Complexity Score: low     Goals:  Short Term Goals: 4 weeks   1. Pt will be compliant with HEP to promote independent management of current diagnosis.  2. Pt will have improved B knee strength to 5/5 to improve ability to lift and carry grocery bags  3. Pt will report subjective pain ratings no greater than 6/10 with ADLs.    Long Term Goals: 8 weeks   1. Pt will have reduced FOTO limitation score from 49% to no greater than 30% to indicate improved functional mobility.  2. Pt will have improved B hip strength by at least 1/2 grade to improve ability to perform sit to stand transfers without UE assist.  3. Pt will be able to ascend/descend ladder into his truck with no pain to return to his work responsibilities.  4. Pt will report subjective pain ratings no greater than 2/10 with ADLs.    PLAN   Plan of care Certification: 4/28/2022 to 6/28/2022.    Outpatient Physical Therapy 2 times weekly for 8 weeks to include the following interventions: Manual Therapy, Moist Heat/ Ice, Neuromuscular Re-ed, Patient Education, Therapeutic Activities, Therapeutic Exercise and kinesiotaping.     Maribeth Howell, PT      I CERTIFY THE NEED FOR THESE SERVICES FURNISHED UNDER THIS PLAN OF TREATMENT AND WHILE UNDER MY CARE   Physician's comments:     Physician's Signature: ___________________________________________________

## 2022-05-08 NOTE — PROGRESS NOTES
"  Physical Therapy Treatment Note     Name: Manjit Silverio  Clinic Number: [id number]    Therapy Diagnosis:   Encounter Diagnosis   Name Primary?    Decreased strength of lower extremity Yes     Physician: Barrington Copeland MD    Visit Date: 5/9/2022    Physician Orders: PT Eval and Treat Quad+VMO Strengthening  Medical Diagnosis from Referral: S83.241A (ICD-10-CM) - Other tear of medial meniscus of right knee as current injury, initial encounter  Evaluation Date: 4/28/2022  Authorization Period Expiration: 12/31/2022  Plan of Care Expiration: 6/28/2022  Progress Note Due: 5/28/2022  Visit # / Visits authorized: 2/ 20   FOTO: 2/5    Time In: 8:50 am   Time Out: 9:25 am  Total Billable Time: 35 minutes    Precautions: Standard    Subjective     Pt reports: his knee feels alright today.   He was compliant with home exercise program.  Response to previous treatment: none  Functional change: none    Pain: 0/10  Location: R knee    Objective     Manjit received therapeutic exercises to develop strength and ROM for 45 minutes including:      Date 5/8/2022 5/2/2022   Visit 2/20 1/20   POC exp 6/28/2022 6/28/2022   PN 5/28/2022 5/28/2022   FTF 5/28/2022 5/28/2022   FOTO 2/5 1/5        Bike     Mat:     HS str 3 x 30" B 3 x 30" B   Gastroc Str.  2 x 30" B   SAQ  2 x 10 B w 1.5#   Bridges  3 x 10   SLR 2 x 10 x 2# B 2 x 10   SL Abd 2 x 10 x 2# B 2 x 10   Heel Slides     Seated:     LAQs     Seated Hip Flex     Shuttle  Leg Press     Standing:     Shuttle leg press next    TKE     Hip Abd     Hip Flex     Hip Ext     HS Curls 2 x 10 x 4# B    Step Ups L2 2 x 10    Step Downs     Calf raises 2 x 10    Seen by: LT EG          Home Exercises Provided and Patient Education Provided     Education provided:   - importance of the consistent performance of HEP    Written Home Exercises Provided: Patient instructed to cont prior HEP.  Exercises were reviewed and Manjit was able to demonstrate them prior to the end of the session.  " Manjit demonstrated good  understanding of the education provided.       Assessment     Manjit arrived to therapy continuing to report no pain in his knee today. He was able to progress most exercises today, and tolerated them well with no reports of increased symptoms at the end of today's session. Will continue to progress as tolerated.     Manjit Is progressing well towards his goals.   Pt prognosis is Good.     Pt will continue to benefit from skilled outpatient physical therapy to address the deficits listed in the problem list box on initial evaluation, provide pt/family education and to maximize pt's level of independence in the home and community environment.     Pt's spiritual, cultural and educational needs considered and pt agreeable to plan of care and goals.     Anticipated barriers to physical therapy: None    Goals:  Short Term Goals: 4 weeks   1. Pt will be compliant with HEP to promote independent management of current diagnosis. In progress, not met  2. Pt will have improved B knee strength to 5/5 to improve ability to lift and carry grocery bags. In progress, not met  3. Pt will report subjective pain ratings no greater than 6/10 with ADLs. In progress, not met     Long Term Goals: 8 weeks   1. Pt will have reduced FOTO limitation score from 49% to no greater than 30% to indicate improved functional mobility. In progress, not met  2. Pt will have improved B hip strength by at least 1/2 grade to improve ability to perform sit to stand transfers without UE assist. In progress, not met  3. Pt will be able to ascend/descend ladder into his truck with no pain to return to his work responsibilities. In progress, not met  4. Pt will report subjective pain ratings no greater than 2/10 with ADLs. In progress, not met    Plan     Continue with PT POC and progress as tolerated.     Maribeth Howell, PT

## 2022-05-09 ENCOUNTER — CLINICAL SUPPORT (OUTPATIENT)
Dept: REHABILITATION | Facility: HOSPITAL | Age: 47
End: 2022-05-09
Payer: COMMERCIAL

## 2022-05-09 DIAGNOSIS — R29.898 DECREASED STRENGTH OF LOWER EXTREMITY: Primary | ICD-10-CM

## 2022-05-09 PROCEDURE — 97110 THERAPEUTIC EXERCISES: CPT | Mod: PO

## 2022-05-17 ENCOUNTER — CLINICAL SUPPORT (OUTPATIENT)
Dept: REHABILITATION | Facility: HOSPITAL | Age: 47
End: 2022-05-17
Payer: COMMERCIAL

## 2022-05-17 DIAGNOSIS — R29.898 DECREASED STRENGTH OF LOWER EXTREMITY: Primary | ICD-10-CM

## 2022-05-17 PROCEDURE — 97110 THERAPEUTIC EXERCISES: CPT | Mod: PO,CQ

## 2022-05-17 NOTE — PROGRESS NOTES
"  Physical Therapy Treatment Note     Name: Manjit Silverio  Clinic Number: [id number]    Therapy Diagnosis:   Encounter Diagnosis   Name Primary?    Decreased strength of lower extremity Yes     Physician: Barrington Copeland MD    Visit Date: 5/17/2022    Physician Orders: PT Eval and Treat Quad+VMO Strengthening  Medical Diagnosis from Referral: S83.241A (ICD-10-CM) - Other tear of medial meniscus of right knee as current injury, initial encounter  Evaluation Date: 4/28/2022  Authorization Period Expiration: 12/31/2022  Plan of Care Expiration: 6/28/2022  Progress Note Due: 5/28/2022  Visit # / Visits authorized: 3/ 20   FOTO: 3/5    Time In: 4:45 pm   Time Out: 5:30 pm  Total Billable Time: 45 minutes    Precautions: Standard    Subjective     Pt reports: his knee feels alright today.   He was compliant with home exercise program.  Response to previous treatment: none  Functional change: none    Pain: 0/10  Location: R knee    Objective     Manjit received therapeutic exercises to develop strength and ROM for 45 minutes including:      Date 5/17/2022 5/8/2022 5/2/2022   Visit 3/20 2/20 1/20   POC exp  6/28/2022 6/28/2022   PN  5/28/2022 5/28/2022   FTF  5/28/2022 5/28/2022   FOTO 3/5 2/5 1/5         Bike L4 x 5 min      Mat:      HS str  3 x 30" B 3 x 30" B   Gastroc Str.   2 x 30" B   SAQ   2 x 10 B w 1.5#   Bridges   3 x 10   SLR  2 x 10 x 2# B 2 x 10   SL Abd  2 x 10 x 2# B 2 x 10   Heel Slides      Seated:      LAQs 3 x 10 OTB   B      Seated Hip Flex      Shuttle  Leg Press      Standing:      Monster walks  3 x 12 ft RTB     Lateral steps  3 x 12 ft RTB      Shuttle leg press 2 x 10 75#  next    TKE 2 x 10 OTB B     Hip Abd      Hip Flex      Hip Ext      HS Curls 3 x 10 4# B  2 x 10 x 4# B    Step Ups Stairs   2 x 10 B  L2 2 x 10    Step Downs      Calf raises  2 x 10    Seen by: EM LT EG          Home Exercises Provided and Patient Education Provided     Education provided:   - importance of the consistent " performance of HEP    Written Home Exercises Provided: Patient instructed to cont prior HEP.  Exercises were reviewed and Manjit was able to demonstrate them prior to the end of the session.  Manjit demonstrated good  understanding of the education provided.       Assessment     Manjit arrived to therapy with no increases in knee pain. Patient able to progress through exercises with no increases in pain. Patient ended session stating he felt fine.     Manjit Is progressing well towards his goals.   Pt prognosis is Good.     Pt will continue to benefit from skilled outpatient physical therapy to address the deficits listed in the problem list box on initial evaluation, provide pt/family education and to maximize pt's level of independence in the home and community environment.     Pt's spiritual, cultural and educational needs considered and pt agreeable to plan of care and goals.     Anticipated barriers to physical therapy: None    Goals:  Short Term Goals: 4 weeks   1. Pt will be compliant with HEP to promote independent management of current diagnosis. In progress, not met  2. Pt will have improved B knee strength to 5/5 to improve ability to lift and carry grocery bags. In progress, not met  3. Pt will report subjective pain ratings no greater than 6/10 with ADLs. In progress, not met     Long Term Goals: 8 weeks   1. Pt will have reduced FOTO limitation score from 49% to no greater than 30% to indicate improved functional mobility. In progress, not met  2. Pt will have improved B hip strength by at least 1/2 grade to improve ability to perform sit to stand transfers without UE assist. In progress, not met  3. Pt will be able to ascend/descend ladder into his truck with no pain to return to his work responsibilities. In progress, not met  4. Pt will report subjective pain ratings no greater than 2/10 with ADLs. In progress, not met    Plan     Continue with PT POC and progress as tolerated.     [place], PTA

## 2022-05-19 ENCOUNTER — CLINICAL SUPPORT (OUTPATIENT)
Dept: REHABILITATION | Facility: HOSPITAL | Age: 47
End: 2022-05-19
Payer: COMMERCIAL

## 2022-05-19 DIAGNOSIS — R29.898 DECREASED STRENGTH OF LOWER EXTREMITY: Primary | ICD-10-CM

## 2022-05-19 PROCEDURE — 97110 THERAPEUTIC EXERCISES: CPT | Mod: PO,CQ

## 2022-05-19 NOTE — PROGRESS NOTES
"  Physical Therapy Treatment Note     Name: Manjit Silverio  Clinic Number: [id number]    Therapy Diagnosis:   No diagnosis found.  Physician: Barrington Copeland MD    Visit Date: 5/19/2022    Physician Orders: PT Eval and Treat Quad+VMO Strengthening  Medical Diagnosis from Referral: S83.241A (ICD-10-CM) - Other tear of medial meniscus of right knee as current injury, initial encounter  Evaluation Date: 4/28/2022  Authorization Period Expiration: 12/31/2022  Plan of Care Expiration: 6/28/2022  Progress Note Due: 5/28/2022  Visit # / Visits authorized: 4/ 20   FOTO: 4/5    Time In: 4:45 pm   Time Out: 5:30 pm  Total Billable Time: 45 minutes    Precautions: Standard    Subjective     Pt reports: feel fine just sore   He was compliant with home exercise program.  Response to previous treatment: none  Functional change: none    Pain: 0/10  Location: R knee    Objective     Manjit received therapeutic exercises to develop strength and ROM for 45 minutes including:      Date 5/19/2022 5/17/2022 5/8/2022 5/2/2022   Visit 4/20 3/20 2/20 1/20   POC exp   6/28/2022 6/28/2022   PN   5/28/2022 5/28/2022   FTF   5/28/2022 5/28/2022   FOTO 4/5 3/5 2/5 1/5          Bike L4 x 5 min  L4 x 5 min      Mat:       HS str   3 x 30" B 3 x 30" B   Gastroc Str.    2 x 30" B   SAQ    2 x 10 B w 1.5#   Bridges    3 x 10   SLR   2 x 10 x 2# B 2 x 10   SL Abd   2 x 10 x 2# B 2 x 10   Heel Slides       Seated:       LAQs 3 x 10 Green sportscord  3 x 10 OTB   B      Seated Hip Flex       Shuttle  Leg Press       Standing:       Monster walks   3 x 12 ft RTB     Lateral steps   3 x 12 ft RTB      HS stretch  3 x 30" B       Calf stretch  3 x 30" B       Shuttle heel raises   3 x 10 75#       Shuttle leg press 3 x 10 75#  2 x 10 75#  next    TKE 2 x 10 OTB B  2 x 10 OTB B     Hip Abd       Hip Flex       Hip Ext       HS Curls 3 x 10 4# B  3 x 10 4# B  2 x 10 x 4# B    Step Ups  Stairs   2 x 10 B  L2 2 x 10    Step Downs 2 x 10 L1 B       Calf " raises   2 x 10    Seen by: EM EM LT EG          Home Exercises Provided and Patient Education Provided     Education provided:   - importance of the consistent performance of HEP    Written Home Exercises Provided: Patient instructed to cont prior HEP.  Exercises were reviewed and Manjit was able to demonstrate them prior to the end of the session.  Manjit demonstrated good  understanding of the education provided.       Assessment     Manjit arrived to therapy with no increases in knee pain just muscle soreness in the leg. Patient able to progress through exercises with no increases in pain, patient had slight difficulty with step downs as seen with R LE shaking during descent. Patient ended session stating he felt fine.     Manjit Is progressing well towards his goals.   Pt prognosis is Good.     Pt will continue to benefit from skilled outpatient physical therapy to address the deficits listed in the problem list box on initial evaluation, provide pt/family education and to maximize pt's level of independence in the home and community environment.     Pt's spiritual, cultural and educational needs considered and pt agreeable to plan of care and goals.     Anticipated barriers to physical therapy: None    Goals:  Short Term Goals: 4 weeks   1. Pt will be compliant with HEP to promote independent management of current diagnosis. In progress, not met  2. Pt will have improved B knee strength to 5/5 to improve ability to lift and carry grocery bags. In progress, not met  3. Pt will report subjective pain ratings no greater than 6/10 with ADLs. In progress, not met     Long Term Goals: 8 weeks   1. Pt will have reduced FOTO limitation score from 49% to no greater than 30% to indicate improved functional mobility. In progress, not met  2. Pt will have improved B hip strength by at least 1/2 grade to improve ability to perform sit to stand transfers without UE assist. In progress, not met  3. Pt will be able to  ascend/descend ladder into his truck with no pain to return to his work responsibilities. In progress, not met  4. Pt will report subjective pain ratings no greater than 2/10 with ADLs. In progress, not met    Plan     Continue with PT POC and progress as tolerated.     [place] Amado, PTA

## 2022-05-24 ENCOUNTER — PATIENT MESSAGE (OUTPATIENT)
Dept: FAMILY MEDICINE | Facility: CLINIC | Age: 47
End: 2022-05-24
Payer: COMMERCIAL

## 2022-05-24 ENCOUNTER — CLINICAL SUPPORT (OUTPATIENT)
Dept: REHABILITATION | Facility: HOSPITAL | Age: 47
End: 2022-05-24
Payer: COMMERCIAL

## 2022-05-24 DIAGNOSIS — R29.898 DECREASED STRENGTH OF LOWER EXTREMITY: Primary | ICD-10-CM

## 2022-05-24 PROCEDURE — 97110 THERAPEUTIC EXERCISES: CPT | Mod: PO,CQ

## 2022-05-24 NOTE — PROGRESS NOTES
"  Physical Therapy Treatment Note     Name: Manjit Silverio  Clinic Number: [id number]    Therapy Diagnosis:   Encounter Diagnosis   Name Primary?    Decreased strength of lower extremity Yes     Physician: Barrington Copeland MD    Visit Date: 5/24/2022    Physician Orders: PT Eval and Treat Quad+VMO Strengthening  Medical Diagnosis from Referral: S83.241A (ICD-10-CM) - Other tear of medial meniscus of right knee as current injury, initial encounter  Evaluation Date: 4/28/2022  Authorization Period Expiration: 12/31/2022  Plan of Care Expiration: 6/28/2022  Progress Note Due: 5/28/2022  Visit # / Visits authorized: 5/ 20   FOTO: 5/5    Time In: 4:45 pm   Time Out: 5:30 pm  Total Billable Time: 45 minutes    Precautions: Standard    Subjective     Pt reports: feel fine   He was compliant with home exercise program.  Response to previous treatment: none  Functional change: none    Pain: 0/10  Location: R knee    Objective     Manjit received therapeutic exercises to develop strength and ROM for 45 minutes including:      Date 5/24/2022 5/19/2022 5/17/2022 5/8/2022 5/2/2022   Visit 5/20 4/20 3/20 2/20 1/20   POC exp    6/28/2022 6/28/2022   PN    5/28/2022 5/28/2022   FTF    5/28/2022 5/28/2022   FOTO DONE 4/5 3/5 2/5 1/5           Bike L5 x 8 min  L4 x 5 min  L4 x 5 min      Mat:        HS str    3 x 30" B 3 x 30" B   Gastroc Str.     2 x 30" B   SAQ     2 x 10 B w 1.5#   Bridges     3 x 10   SLR    2 x 10 x 2# B 2 x 10   SL Abd    2 x 10 x 2# B 2 x 10   Heel Slides        Seated:        LAQs  3 x 10 Green sportscord  3 x 10 OTB   B      Seated Hip Flex        Shuttle  Leg Press        Standing:        Monster walks    3 x 12 ft RTB     Lateral steps    3 x 12 ft RTB      HS stretch 3 x 30" B   3 x 30" B       Calf stretch   3 x 30" B       Squats  20 in box   30x        deadlifts   20 lb KB       Lunges   10x B        Shuttle heel raises  3 x 10 100#   3 x 10 75#       Shuttle leg press 3 x 10 100# 3 x 10 75#  2 x 10 " 75#  next    TKE  2 x 10 OTB B  2 x 10 OTB B     Hip Abd        Hip Flex        Hip Ext        HS Curls  3 x 10 4# B  3 x 10 4# B  2 x 10 x 4# B    Step Ups   Stairs   2 x 10 B  L2 2 x 10    Step Downs  2 x 10 L1 B       Calf raises    2 x 10    Seen by: EM EM EM LT EG          Home Exercises Provided and Patient Education Provided     Education provided:   - importance of the consistent performance of HEP    Written Home Exercises Provided: Patient instructed to cont prior HEP.  Exercises were reviewed and Manjit was able to demonstrate them prior to the end of the session.  Manjit demonstrated good  understanding of the education provided.       Assessment     Manjit arrived to therapy with no increases in knee discomfort. Patient was able to progress through his session with no increases in discomfort. Patient ended session stating he felt fine.     Patient had a FOTO limitation score of 14%      Manjit Is progressing well towards his goals.   Pt prognosis is Good.     Pt will continue to benefit from skilled outpatient physical therapy to address the deficits listed in the problem list box on initial evaluation, provide pt/family education and to maximize pt's level of independence in the home and community environment.     Pt's spiritual, cultural and educational needs considered and pt agreeable to plan of care and goals.     Anticipated barriers to physical therapy: None    Goals:  Short Term Goals: 4 weeks   1. Pt will be compliant with HEP to promote independent management of current diagnosis. In progress, not met  2. Pt will have improved B knee strength to 5/5 to improve ability to lift and carry grocery bags. In progress, not met  3. Pt will report subjective pain ratings no greater than 6/10 with ADLs. In progress, not met     Long Term Goals: 8 weeks   1. Pt will have reduced FOTO limitation score from 49% to no greater than 30% to indicate improved functional mobility. In progress, not met  2. Pt  will have improved B hip strength by at least 1/2 grade to improve ability to perform sit to stand transfers without UE assist. In progress, not met  3. Pt will be able to ascend/descend ladder into his truck with no pain to return to his work responsibilities. In progress, not met  4. Pt will report subjective pain ratings no greater than 2/10 with ADLs. In progress, not met    Plan     Continue with PT POC and progress as tolerated.     [place] Amado, PTA

## 2022-05-25 ENCOUNTER — PATIENT MESSAGE (OUTPATIENT)
Dept: ENDOSCOPY | Facility: HOSPITAL | Age: 47
End: 2022-05-25
Payer: COMMERCIAL

## 2022-05-25 ENCOUNTER — CLINICAL SUPPORT (OUTPATIENT)
Dept: REHABILITATION | Facility: HOSPITAL | Age: 47
End: 2022-05-25
Payer: COMMERCIAL

## 2022-05-25 DIAGNOSIS — R29.898 DECREASED STRENGTH OF LOWER EXTREMITY: Primary | ICD-10-CM

## 2022-05-25 DIAGNOSIS — Z12.11 COLON CANCER SCREENING: Primary | ICD-10-CM

## 2022-05-25 PROCEDURE — 97110 THERAPEUTIC EXERCISES: CPT | Mod: PO,CQ

## 2022-05-25 RX ORDER — POLYETHYLENE GLYCOL 3350, SODIUM SULFATE ANHYDROUS, SODIUM BICARBONATE, SODIUM CHLORIDE, POTASSIUM CHLORIDE 236; 22.74; 6.74; 5.86; 2.97 G/4L; G/4L; G/4L; G/4L; G/4L
4 POWDER, FOR SOLUTION ORAL ONCE
Qty: 4000 ML | Refills: 0 | Status: SHIPPED | OUTPATIENT
Start: 2022-05-25 | End: 2022-05-25

## 2022-05-25 NOTE — PROGRESS NOTES
"  Physical Therapy Treatment Note     Name: Manjit Silverio  Clinic Number: [id number]    Therapy Diagnosis:   Encounter Diagnosis   Name Primary?    Decreased strength of lower extremity Yes     Physician: Barrington Copeland MD    Visit Date: 5/25/2022    Physician Orders: PT Eval and Treat Quad+VMO Strengthening  Medical Diagnosis from Referral: S83.241A (ICD-10-CM) - Other tear of medial meniscus of right knee as current injury, initial encounter  Evaluation Date: 4/28/2022  Authorization Period Expiration: 12/31/2022  Plan of Care Expiration: 6/28/2022  Progress Note Due: 5/28/2022  Visit # / Visits authorized: 6/ 20   FOTO: 5/5    Time In: 4:15 pm   Time Out: 5:00 pm  Total Billable Time: 45 minutes    Precautions: Standard    Subjective     Pt reports: feel fine my knee did pop at work   He was compliant with home exercise program.  Response to previous treatment: none  Functional change: none    Pain: 0/10  Location: R knee    Objective     Manjit received therapeutic exercises to develop strength and ROM for 45 minutes including:      Date 5/25/2022 5/24/2022 5/19/2022 5/17/2022 5/8/2022 5/2/2022   Visit 6/20 5/20 4/20 3/20 2/20 1/20   POC exp     6/28/2022 6/28/2022   PN     5/28/2022 5/28/2022   FTF     5/28/2022 5/28/2022   FOTO  DONE 4/5 3/5 2/5 1/5            Bike L5 x 8 min  L5 x 8 min  L4 x 5 min  L4 x 5 min      Mat:         Prone Quad stretch  3 x 30" B         HS str     3 x 30" B 3 x 30" B   Gastroc Str.      2 x 30" B   SAQ      2 x 10 B w 1.5#   Bridges      3 x 10   SLR     2 x 10 x 2# B 2 x 10   SL Abd     2 x 10 x 2# B 2 x 10   Heel Slides         Seated:         LAQs 3 x 10 green sportscord B   3 x 10 Green sportscord  3 x 10 OTB   B      Seated Hip Flex         Shuttle  Leg Press         Standing:         Monster walks     3 x 12 ft RTB     Lateral steps     3 x 12 ft RTB      HS stretch  3 x 30" B   3 x 30" B       Calf stretch 3 x 30" B    3 x 30" B       Squats   20 in box   30x      "   deadlifts    20 lb KB       Lunges    10x B        Shuttle heel raises  3 x 10 100#  3 x 10 100#   3 x 10 75#       Shuttle leg press 3 x 10 100#  3 x 10 100# 3 x 10 75#  2 x 10 75#  next    TKE 2 x 10 green sportscord B   2 x 10 OTB B  2 x 10 OTB B     Hip Abd         Hip Flex         Hip Ext         HS Curls   3 x 10 4# B  3 x 10 4# B  2 x 10 x 4# B    Step Ups    Stairs   2 x 10 B  L2 2 x 10    Step Downs   2 x 10 L1 B       Calf raises     2 x 10    Seen by: EM EM EM EM LT EG          Home Exercises Provided and Patient Education Provided     Education provided:   - importance of the consistent performance of HEP    Written Home Exercises Provided: Patient instructed to cont prior HEP.  Exercises were reviewed and Manjit was able to demonstrate them prior to the end of the session.  Manjit demonstrated good  understanding of the education provided.       Assessment     Manjit arrived to therapy with no increases in discomfort although he did note that he felt pop in the knee after getting out of his truck at work but indicates no pain. He describes the feeling of the knee feeling like it had no support. Patient progressed through his session with no increases in pain. Patient ended session stating he felt fine.     Manjit Is progressing well towards his goals.   Pt prognosis is Good.     Pt will continue to benefit from skilled outpatient physical therapy to address the deficits listed in the problem list box on initial evaluation, provide pt/family education and to maximize pt's level of independence in the home and community environment.     Pt's spiritual, cultural and educational needs considered and pt agreeable to plan of care and goals.     Anticipated barriers to physical therapy: None    Goals:  Short Term Goals: 4 weeks   1. Pt will be compliant with HEP to promote independent management of current diagnosis. In progress, not met  2. Pt will have improved B knee strength to 5/5 to improve ability to  lift and carry grocery bags. In progress, not met  3. Pt will report subjective pain ratings no greater than 6/10 with ADLs. In progress, not met     Long Term Goals: 8 weeks   1. Pt will have reduced FOTO limitation score from 49% to no greater than 30% to indicate improved functional mobility. In progress, not met  2. Pt will have improved B hip strength by at least 1/2 grade to improve ability to perform sit to stand transfers without UE assist. In progress, not met  3. Pt will be able to ascend/descend ladder into his truck with no pain to return to his work responsibilities. In progress, not met  4. Pt will report subjective pain ratings no greater than 2/10 with ADLs. In progress, not met    Plan     Continue with PT POC and progress as tolerated.     [place] Amado, PTA

## 2022-05-31 ENCOUNTER — CLINICAL SUPPORT (OUTPATIENT)
Dept: REHABILITATION | Facility: HOSPITAL | Age: 47
End: 2022-05-31
Payer: COMMERCIAL

## 2022-05-31 DIAGNOSIS — R29.898 DECREASED STRENGTH OF LOWER EXTREMITY: Primary | ICD-10-CM

## 2022-05-31 PROCEDURE — 97110 THERAPEUTIC EXERCISES: CPT | Mod: PO,CQ

## 2022-05-31 NOTE — PROGRESS NOTES
"  Physical Therapy Treatment Note     Name: Manjit Silverio  Clinic Number: [id number]    Therapy Diagnosis:   Encounter Diagnosis   Name Primary?    Decreased strength of lower extremity Yes     Physician: Barrington Copeland MD    Visit Date: 5/31/2022    Physician Orders: PT Eval and Treat Quad+VMO Strengthening  Medical Diagnosis from Referral: S83.241A (ICD-10-CM) - Other tear of medial meniscus of right knee as current injury, initial encounter  Evaluation Date: 4/28/2022  Authorization Period Expiration: 12/31/2022  Plan of Care Expiration: 6/28/2022  Progress Note Due: 5/28/2022  Visit # / Visits authorized: 7/ 20   FOTO: 5/5    Time In: 4:45 pm   Time Out: 5:30 pm  Total Billable Time: 45 minutes    Precautions: Standard    Subjective     Pt reports: feels pretty good today    He was compliant with home exercise program.  Response to previous treatment: none  Functional change: none    Pain: 0/10  Location: R knee    Objective     Manjit received therapeutic exercises to develop strength and ROM for 45 minutes including:      Date 5/31/2022 5/25/2022 5/24/2022 5/19/2022 5/17/2022 5/8/2022 5/2/2022   Visit 7/20 6/20 5/20 4/20 3/20 2/20 1/20   POC exp      6/28/2022 6/28/2022   PN      5/28/2022 5/28/2022   FTF      5/28/2022 5/28/2022   FOTO   DONE 4/5 3/5 2/5 1/5             Bike L5 x 8 min  L5 x 8 min  L5 x 8 min  L4 x 5 min  L4 x 5 min      Mat:          Prone Quad stretch   3 x 30" B         HS str      3 x 30" B 3 x 30" B   Gastroc Str.       2 x 30" B   SAQ       2 x 10 B w 1.5#   Bridges       3 x 10   SLR      2 x 10 x 2# B 2 x 10   SL Abd      2 x 10 x 2# B 2 x 10   Heel Slides          Seated:          LAQs 3 x 10 green sportscord B  3 x 10 green sportscord B   3 x 10 Green sportscord  3 x 10 OTB   B      Seated Hip Flex          Shuttle  Leg Press          Standing:          Monster walks      3 x 12 ft RTB     Lateral steps      3 x 12 ft RTB      HS stretch Walking sweepers 5x B   3 x 30" B   3 x " "30" B       Calf stretch 3 x 30" B  3 x 30" B    3 x 30" B       Squats    20 in box   30x        deadlifts  30 lb KB 20x    20 lb KB       Lunges     10x B        Shuttle heel raises  3 x 10 125#  3 x 10 100#  3 x 10 100#   3 x 10 75#       Shuttle leg press 3 x 10 125#  3 x 10 100#  3 x 10 100# 3 x 10 75#  2 x 10 75#  next    TKE  2 x 10 green sportscord B   2 x 10 OTB B  2 x 10 OTB B     Hip Abd          Hip Flex          Hip Ext          HS Curls    3 x 10 4# B  3 x 10 4# B  2 x 10 x 4# B    Step Ups     Stairs   2 x 10 B  L2 2 x 10    Step Downs    2 x 10 L1 B       Calf raises      2 x 10    Seen by: EM EM EM EM EM LT EG          Home Exercises Provided and Patient Education Provided     Education provided:   - importance of the consistent performance of HEP    Written Home Exercises Provided: Patient instructed to cont prior HEP.  Exercises were reviewed and Manjit was able to demonstrate them prior to the end of the session.  Manjit demonstrated good  understanding of the education provided.       Assessment     Manjit arrived to therapy with no increases in discomfort, patient stated he was able to increase activity over the weekend with no increases in pain. Needs VCs for dead lift technique to shift weight backward. Patient ended session stating he felt fine.     Manjit Is progressing well towards his goals.   Pt prognosis is Good.     Pt will continue to benefit from skilled outpatient physical therapy to address the deficits listed in the problem list box on initial evaluation, provide pt/family education and to maximize pt's level of independence in the home and community environment.     Pt's spiritual, cultural and educational needs considered and pt agreeable to plan of care and goals.     Anticipated barriers to physical therapy: None    Goals:  Short Term Goals: 4 weeks   1. Pt will be compliant with HEP to promote independent management of current diagnosis. In progress, not met  2. Pt will have " improved B knee strength to 5/5 to improve ability to lift and carry grocery bags. In progress, not met  3. Pt will report subjective pain ratings no greater than 6/10 with ADLs. In progress, not met     Long Term Goals: 8 weeks   1. Pt will have reduced FOTO limitation score from 49% to no greater than 30% to indicate improved functional mobility. In progress, not met  2. Pt will have improved B hip strength by at least 1/2 grade to improve ability to perform sit to stand transfers without UE assist. In progress, not met  3. Pt will be able to ascend/descend ladder into his truck with no pain to return to his work responsibilities. In progress, not met  4. Pt will report subjective pain ratings no greater than 2/10 with ADLs. In progress, not met    Plan     Continue with PT POC and progress as tolerated.     [place] Amado, PTA

## 2022-06-02 ENCOUNTER — CLINICAL SUPPORT (OUTPATIENT)
Dept: REHABILITATION | Facility: HOSPITAL | Age: 47
End: 2022-06-02
Payer: COMMERCIAL

## 2022-06-02 ENCOUNTER — DOCUMENTATION ONLY (OUTPATIENT)
Dept: REHABILITATION | Facility: HOSPITAL | Age: 47
End: 2022-06-02
Payer: COMMERCIAL

## 2022-06-02 DIAGNOSIS — R29.898 DECREASED STRENGTH OF LOWER EXTREMITY: Primary | ICD-10-CM

## 2022-06-02 PROCEDURE — 97110 THERAPEUTIC EXERCISES: CPT | Mod: PO

## 2022-06-02 NOTE — PROGRESS NOTES
"  Physical Therapy Treatment Note     Name: Manjit Silverio  Clinic Number: [id number]    Therapy Diagnosis:   Encounter Diagnosis   Name Primary?    Decreased strength of lower extremity Yes     Physician: Barrington Copeland MD    Visit Date: 6/2/2022    Physician Orders: PT Eval and Treat Quad+VMO Strengthening  Medical Diagnosis from Referral: S83.241A (ICD-10-CM) - Other tear of medial meniscus of right knee as current injury, initial encounter  Evaluation Date: 4/28/2022  Authorization Period Expiration: 12/31/2022  Plan of Care Expiration: 6/28/2022  Progress Note Due: 5/28/2022  Visit # / Visits authorized: 8/ 20   FOTO: 5/5    Time In: 5:00 pm   Time Out: 5:45 pm  Total Billable Time: 45 minutes    Precautions: Standard    Subjective     Pt reports: he hasn't been having any trouble with ADLs, and is planning on starting back at the gym soon.   He was compliant with home exercise program.  Response to previous treatment: none  Functional change: none    Pain: 0/10  Location: R knee    Objective     Manjit received therapeutic exercises to develop strength and ROM for 45 minutes including:      Date 6/2/2022 5/31/2022 5/25/2022 5/24/2022 5/19/2022 5/17/2022 5/8/2022 5/2/2022   Visit 8/20 7/20 6/20 5/20 4/20 3/20 2/20 1/20   POC exp 6/28/2022 6/28/2022 6/28/2022   FTF 7/2/2022 5/28/2022 5/28/2022   FOTO    DONE 4/5 3/5 2/5 1/5              Bike L5 x 8 min L5 x 8 min  L5 x 8 min  L5 x 8 min  L4 x 5 min  L4 x 5 min      Mat:           Prone Quad stretch    3 x 30" B         HS str       3 x 30" B 3 x 30" B   Gastroc Str.        2 x 30" B   SAQ        2 x 10 B w 1.5#   Bridges        3 x 10   SLR       2 x 10 x 2# B 2 x 10   SL Abd       2 x 10 x 2# B 2 x 10   Heel Slides           Seated:           LAQs 3 x 10 green sport cord B 3 x 10 green sportscord B  3 x 10 green sportscord B   3 x 10 Green sportscord  3 x 10 OTB   B      Seated Hip Flex           Shuttle  Leg Press           Standing:         " "  Monster walks       3 x 12 ft RTB     Lateral steps       3 x 12 ft RTB      HS stretch Walking sweepers 2 x 20 ft Walking sweepers 5x B   3 x 30" B   3 x 30" B       Calf stretch  3 x 30" B  3 x 30" B    3 x 30" B       Squats     20 in box   30x        deadlifts  30 lb KB 20x 30 lb KB 20x    20 lb KB       Lunges      10x B        Shuttle heel raises  3 x 10 125# 3 x 10 125#  3 x 10 100#  3 x 10 100#   3 x 10 75#       Shuttle leg press 3 x 10 125# 3 x 10 125#  3 x 10 100#  3 x 10 100# 3 x 10 75#  2 x 10 75#  next    TKE 2 x 10 green sport cord B  2 x 10 green sportscord B   2 x 10 OTB B  2 x 10 OTB B     Hip Abd           Hip Flex           Hip Ext           HS Curls     3 x 10 4# B  3 x 10 4# B  2 x 10 x 4# B    Step Ups L3 1 x 10 B     Stairs   2 x 10 B  L2 2 x 10    Step Downs     2 x 10 L1 B       Calf raises       2 x 10    SLS           Seen by: LT EM EM EM EM EM LT EG      Lower Extremity Strength  Right LE   Left LE     Knee extension: 5/5 Knee extension: 5/5   Knee flexion: 5/5 Knee flexion: 5/5   Hip flexion: 4+/5 Hip flexion: 4+/5   Hip extension:  4-/5 Hip extension: 4-/5   Hip abduction: 4/5 Hip abduction: 4/5   Hip adduction: NT Hip adduction NT   Ankle dorsiflexion: 5/5 Ankle dorsiflexion: 5/5   Ankle plantarflexion: NT Ankle plantarflexion: NT        Home Exercises Provided and Patient Education Provided     Education provided:   - importance of the consistent performance of HEP    Written Home Exercises Provided: Patient instructed to cont prior HEP.  Exercises were reviewed and Manjit was able to demonstrate them prior to the end of the session.  Manjit demonstrated good  understanding of the education provided.       Assessment     Manjit arrived to therapy continuing to report no pain with ADLs. He plans on starting back at the gym soon. He tolerated all exercises well today with no reports of increased symptoms. Required a couple of VCs for correct performance of deadlifts. Planning to " reassess next visit and potentially planning for discharge to continue working on working on LE strength at the gym.     Manjit Is progressing well towards his goals.   Pt prognosis is Good.     Pt will continue to benefit from skilled outpatient physical therapy to address the deficits listed in the problem list box on initial evaluation, provide pt/family education and to maximize pt's level of independence in the home and community environment.     Pt's spiritual, cultural and educational needs considered and pt agreeable to plan of care and goals.     Anticipated barriers to physical therapy: None    Goals:  Short Term Goals: 4 weeks   1. Pt will be compliant with HEP to promote independent management of current diagnosis. met  2. Pt will have improved B knee strength to 5/5 to improve ability to lift and carry grocery bags. In progress, not met  3. Pt will report subjective pain ratings no greater than 6/10 with ADLs. met     Long Term Goals: 8 weeks   1. Pt will have reduced FOTO limitation score from 49% to no greater than 30% to indicate improved functional mobility. In progress, not met  2. Pt will have improved B hip strength by at least 1/2 grade to improve ability to perform sit to stand transfers without UE assist. In progress, not met  3. Pt will be able to ascend/descend ladder into his truck with no pain to return to his work responsibilities. In progress, not met  4. Pt will report subjective pain ratings no greater than 2/10 with ADLs. In progress, not met    Plan     Continue with PT POC and progress as tolerated.     Maribeth Howell, PT

## 2022-06-02 NOTE — PROGRESS NOTES
PT/PTA met face to face to discuss pt's treatment plan and progress towards established goals. Pt will be seen by a physical therapist minimally every 6th visit or every 30 days.    Maribeth Howell PT  6/2/2022    Face to Face PTA Conference performed with Maribeth Howell PT regarding patient's current status, overall progress, and plan of care. Pt will be seen by a physical therapist minimally every 6th visit or every 30 days.    [place],PTA  6/2/2022

## 2022-06-07 ENCOUNTER — CLINICAL SUPPORT (OUTPATIENT)
Dept: REHABILITATION | Facility: HOSPITAL | Age: 47
End: 2022-06-07
Payer: COMMERCIAL

## 2022-06-07 DIAGNOSIS — R29.898 DECREASED STRENGTH OF LOWER EXTREMITY: Primary | ICD-10-CM

## 2022-06-07 PROCEDURE — 97164 PT RE-EVAL EST PLAN CARE: CPT | Mod: PO

## 2022-06-07 NOTE — PROGRESS NOTES
"  Physical Therapy Treatment Note     Name: Manjit Silverio  Clinic Number: [id number]    Therapy Diagnosis:   Encounter Diagnosis   Name Primary?    Decreased strength of lower extremity Yes     Physician: Barrington Copeland MD    Visit Date: 6/7/2022    Physician Orders: PT Eval and Treat Quad+VMO Strengthening  Medical Diagnosis from Referral: S83.241A (ICD-10-CM) - Other tear of medial meniscus of right knee as current injury, initial encounter  Evaluation Date: 4/28/2022  Authorization Period Expiration: 12/31/2022  Plan of Care Expiration: 6/28/2022  Progress Note Due: 5/28/2022  Visit # / Visits authorized: 9/ 20   FOTO: 5/5    Time In: 5:00 pm   Time Out: 5:20 pm  Total Billable Time: 20 minutes    Precautions: Standard    Subjective     Pt reports: his knee has been feeling good lately   He was compliant with home exercise program.  Response to previous treatment: none  Functional change: none    Pain: 0/10  Location: R knee    Objective     Manjit received therapeutic exercises to develop strength and ROM for 0 minutes including:      Date 6/7/2022 6/2/2022 5/31/2022 5/25/2022 5/24/2022 5/19/2022 5/17/2022 5/8/2022 5/2/2022   Visit 9/20 8/20 7/20 6/20 5/20 4/20 3/20 2/20 1/20   POC exp 6/28/2022 6/28/2022 6/28/2022 6/28/2022   FTF 7/2/2022 7/2/2022 5/28/2022 5/28/2022   FOTO     DONE 4/5 3/5 2/5 1/5               Bike  L5 x 8 min L5 x 8 min  L5 x 8 min  L5 x 8 min  L4 x 5 min  L4 x 5 min      Mat:            Prone Quad stretch     3 x 30" B         HS str        3 x 30" B 3 x 30" B   Gastroc Str.         2 x 30" B   SAQ         2 x 10 B w 1.5#   Bridges         3 x 10   SLR        2 x 10 x 2# B 2 x 10   SL Abd        2 x 10 x 2# B 2 x 10   Heel Slides            Seated:            LAQs  3 x 10 green sport cord B 3 x 10 green sportscord B  3 x 10 green sportscord B   3 x 10 Green sportscord  3 x 10 OTB   B      Seated Hip Flex            Shuttle  Leg Press            Standing:            Monster " "walks        3 x 12 ft RTB     Lateral steps        3 x 12 ft RTB      HS stretch  Walking sweepers 2 x 20 ft Walking sweepers 5x B   3 x 30" B   3 x 30" B       Calf stretch   3 x 30" B  3 x 30" B    3 x 30" B       Squats      20 in box   30x        deadlifts   30 lb KB 20x 30 lb KB 20x    20 lb KB       Lunges       10x B        Shuttle heel raises   3 x 10 125# 3 x 10 125#  3 x 10 100#  3 x 10 100#   3 x 10 75#       Shuttle leg press  3 x 10 125# 3 x 10 125#  3 x 10 100#  3 x 10 100# 3 x 10 75#  2 x 10 75#  next    TKE  2 x 10 green sport cord B  2 x 10 green sportscord B   2 x 10 OTB B  2 x 10 OTB B     Hip Abd            Hip Flex            Hip Ext            HS Curls      3 x 10 4# B  3 x 10 4# B  2 x 10 x 4# B    Step Ups  L3 1 x 10 B     Stairs   2 x 10 B  L2 2 x 10    Step Downs      2 x 10 L1 B       Calf raises        2 x 10    SLS            Seen by: LT LT EM EM EM EM EM LT EG      Lower Extremity Strength  Right LE   Left LE     Knee extension: 5/5 Knee extension: 5/5   Knee flexion: 5/5 Knee flexion: 5/5   Hip flexion: 4+/5 Hip flexion: 4+/5   Hip extension:  4-/5 Hip extension: 4-/5   Hip abduction: 4/5 Hip abduction: 4/5   Hip adduction: NT Hip adduction NT   Ankle dorsiflexion: 5/5 Ankle dorsiflexion: 5/5   Ankle plantarflexion: NT Ankle plantarflexion: NT      Step down test: valgus L, good eccentric quad control        Function:     - SLS R: 30 sec  - SLS L: 30 sec  - Squat: weight shifted evenly good depth     Home Exercises Provided and Patient Education Provided     Education provided:   - importance of the consistent performance of HEP    Written Home Exercises Provided: Patient instructed to cont prior HEP.  Exercises were reviewed and Manjit was able to demonstrate them prior to the end of the session.  Manjit demonstrated good  understanding of the education provided.       Assessment     Manjit arrived to therapy continuing to report no pain with ADLs. A reassessment was performed today " and he was noted to have improved knee strength, ROM, and balance. He is starting to go back to the gym soon and is appropriate at this point for discharge to [facility] to maintain strength at home. Provided patient with some guidance for machines to use at the gym. FOTO was also reassessed today and patient was noted to have increased his score to 99, indicating only 1% limitation.    Manjit Is progressing well towards his goals.   Pt prognosis is Good.     Pt will continue to benefit from skilled outpatient physical therapy to address the deficits listed in the problem list box on initial evaluation, provide pt/family education and to maximize pt's level of independence in the home and community environment.     Pt's spiritual, cultural and educational needs considered and pt agreeable to plan of care and goals.     Anticipated barriers to physical therapy: None    Goals:  Short Term Goals: 4 weeks   1. Pt will be compliant with [facility] to promote independent management of current diagnosis. met  2. Pt will have improved B knee strength to 5/5 to improve ability to lift and carry grocery bags. met  3. Pt will report subjective pain ratings no greater than 6/10 with ADLs. met     Long Term Goals: 8 weeks   1. Pt will have reduced FOTO limitation score from 49% to no greater than 30% to indicate improved functional mobility. met  2. Pt will have improved B hip strength by at least 1/2 grade to improve ability to perform sit to stand transfers without UE assist.  met  3. Pt will be able to ascend/descend ladder into his truck with no pain to return to his work responsibilities. met  4. Pt will report subjective pain ratings no greater than 2/10 with ADLs. met    Plan     Discharge to [facility].    Maribeth Howell, PT

## 2022-06-09 NOTE — PLAN OF CARE
[facility]  PT Discharge Note    Name: Manjit Silverio  Clinic Number: [id number]    Therapy Diagnosis:   Encounter Diagnosis   Name Primary?    Decreased strength of lower extremity Yes     Physician: Barrington Copeland MD    Physician Orders: PT Eval and Treat Quad+VMO Strengthening  Medical Diagnosis from Referral: S83.241A (ICD-10-CM) - Other tear of medial meniscus of right knee as current injury, initial encounter  Evaluation Date: 4/28/2022      Date of Last visit: 6/7/2022  Total Visits Received: 10    ASSESSMENT      Manjit arrived to therapy continuing to report no pain with ADLs. A reassessment was performed today and he was noted to have improved knee strength, ROM, and balance. He is starting to go back to the gym soon and is appropriate at this point for discharge to HEP to maintain strength at home. Provided patient with some guidance for machines to use at the gym. FOTO was also reassessed today and patient was noted to have increased his score to 99, indicating only 1% limitation.    Discharge reason: Patient is now asymptomatic and Patient has met all of his/her goals    Discharge FOTO Score: 99    Goals:   Short Term Goals: 4 weeks   1. Pt will be compliant with HEP to promote independent management of current diagnosis. met  2. Pt will have improved B knee strength to 5/5 to improve ability to lift and carry grocery bags. met  3. Pt will report subjective pain ratings no greater than 6/10 with ADLs. met     Long Term Goals: 8 weeks   1. Pt will have reduced FOTO limitation score from 49% to no greater than 30% to indicate improved functional mobility. met  2. Pt will have improved B hip strength by at least 1/2 grade to improve ability to perform sit to stand transfers without UE assist.  met  3. Pt will be able to ascend/descend ladder into his truck with no pain to return to his work responsibilities. met  4. Pt will report subjective pain ratings no greater than  2/10 with ADLs. met    PLAN   This patient is discharged from Physical Therapy      Maribeth Howell, PT

## 2022-06-10 ENCOUNTER — ANESTHESIA (OUTPATIENT)
Dept: ENDOSCOPY | Facility: HOSPITAL | Age: 47
End: 2022-06-10
Payer: COMMERCIAL

## 2022-06-10 ENCOUNTER — ANESTHESIA EVENT (OUTPATIENT)
Dept: ENDOSCOPY | Facility: HOSPITAL | Age: 47
End: 2022-06-10
Payer: COMMERCIAL

## 2022-06-10 ENCOUNTER — HOSPITAL ENCOUNTER (OUTPATIENT)
Facility: HOSPITAL | Age: 47
Discharge: HOME | End: 2022-06-10
Attending: COLON & RECTAL SURGERY | Admitting: COLON & RECTAL SURGERY
Payer: COMMERCIAL

## 2022-06-10 VITALS
TEMPERATURE: 98 F | HEIGHT: 64 IN | HEART RATE: 66 BPM | WEIGHT: 260 LBS | DIASTOLIC BLOOD PRESSURE: 90 MMHG | BODY MASS INDEX: 44.39 KG/M2 | OXYGEN SATURATION: 97 % | SYSTOLIC BLOOD PRESSURE: 127 MMHG | RESPIRATION RATE: 23 BRPM

## 2022-06-10 DIAGNOSIS — Z12.11 SCREENING FOR COLON CANCER: ICD-10-CM

## 2022-06-10 PROCEDURE — 45380 COLONOSCOPY AND BIOPSY: CPT | Performed by: COLON & RECTAL SURGERY

## 2022-06-10 PROCEDURE — 45380 COLONOSCOPY AND BIOPSY: CPT | Mod: 59,,, | Performed by: COLON & RECTAL SURGERY

## 2022-06-10 PROCEDURE — 37000009 HC ANESTHESIA EA ADD 15 MINS: Performed by: COLON & RECTAL SURGERY

## 2022-06-10 PROCEDURE — 45385 PR COLONOSCOPY,REMV LESN,SNARE: ICD-10-PCS | Mod: 33,,, | Performed by: COLON & RECTAL SURGERY

## 2022-06-10 PROCEDURE — 88305 TISSUE EXAM BY PATHOLOGIST: CPT | Mod: 26,,, | Performed by: STUDENT IN AN ORGANIZED HEALTH CARE EDUCATION/TRAINING PROGRAM

## 2022-06-10 PROCEDURE — 88305 TISSUE EXAM BY PATHOLOGIST: CPT | Mod: 59 | Performed by: STUDENT IN AN ORGANIZED HEALTH CARE EDUCATION/TRAINING PROGRAM

## 2022-06-10 PROCEDURE — 25000003 PHARM REV CODE 250: Performed by: FAMILY MEDICINE

## 2022-06-10 PROCEDURE — 27201089 HC SNARE, DISP (ANY): Performed by: COLON & RECTAL SURGERY

## 2022-06-10 PROCEDURE — 63600175 PHARM REV CODE 636 W HCPCS: Performed by: FAMILY MEDICINE

## 2022-06-10 PROCEDURE — 45380 PR COLONOSCOPY,BIOPSY: ICD-10-PCS | Mod: 59,,, | Performed by: COLON & RECTAL SURGERY

## 2022-06-10 PROCEDURE — 45385 COLONOSCOPY W/LESION REMOVAL: CPT | Mod: 33,,, | Performed by: COLON & RECTAL SURGERY

## 2022-06-10 PROCEDURE — 37000008 HC ANESTHESIA 1ST 15 MINUTES: Performed by: COLON & RECTAL SURGERY

## 2022-06-10 PROCEDURE — 88305 TISSUE EXAM BY PATHOLOGIST: ICD-10-PCS | Mod: 26,,, | Performed by: STUDENT IN AN ORGANIZED HEALTH CARE EDUCATION/TRAINING PROGRAM

## 2022-06-10 PROCEDURE — 45385 COLONOSCOPY W/LESION REMOVAL: CPT | Performed by: COLON & RECTAL SURGERY

## 2022-06-10 PROCEDURE — 27201012 HC FORCEPS, HOT/COLD, DISP: Performed by: COLON & RECTAL SURGERY

## 2022-06-10 RX ORDER — PROPOFOL 10 MG/ML
VIAL (ML) INTRAVENOUS
Status: DISCONTINUED | OUTPATIENT
Start: 2022-06-10 | End: 2022-06-10

## 2022-06-10 RX ORDER — LIDOCAINE HYDROCHLORIDE 20 MG/ML
INJECTION, SOLUTION EPIDURAL; INFILTRATION; INTRACAUDAL; PERINEURAL
Status: DISCONTINUED | OUTPATIENT
Start: 2022-06-10 | End: 2022-06-10

## 2022-06-10 RX ADMIN — PROPOFOL 100 MG: 10 INJECTION, EMULSION INTRAVENOUS at 07:06

## 2022-06-10 RX ADMIN — SODIUM CHLORIDE, SODIUM LACTATE, POTASSIUM CHLORIDE, AND CALCIUM CHLORIDE: .6; .31; .03; .02 INJECTION, SOLUTION INTRAVENOUS at 06:06

## 2022-06-10 RX ADMIN — PROPOFOL 50 MG: 10 INJECTION, EMULSION INTRAVENOUS at 07:06

## 2022-06-10 RX ADMIN — LIDOCAINE HYDROCHLORIDE 50 MG: 20 INJECTION, SOLUTION EPIDURAL; INFILTRATION; INTRACAUDAL; PERINEURAL at 07:06

## 2022-06-10 NOTE — BRIEF OP NOTE
[facility])  Brief Operative Note     SUMMARY     Surgery Date: 6/10/2022     Surgeon(s) and Role:     * Robert Joaquin MD - Primary    Assisting Surgeon: None    Pre-op Diagnosis:  Colon cancer screening [Z12.11]    Post-op Diagnosis:  Post-Op Diagnosis Codes:     * Colon cancer screening [Z12.11]    Procedure(s) (LRB):  COLONOSCOPY (N/A)    Anesthesia: Choice    Description of the findings of the procedure: two polyps removed; hemorrhoids and diverticulosis    Estimated Blood Loss: * No values recorded between 6/10/2022 12:00 AM and 6/10/2022  7:36 AM *         Specimens:   Specimen (24h ago, onward)             Start     Ordered    06/10/22 0725  Specimen to Pathology, Surgery Gastrointestinal tract  Once        Comments: Pre-op Diagnosis: Colon cancer screening [Z12.11]Procedure(s):COLONOSCOPY Name of specimens: 1. Hepatic flexure polypectomy2. Sigmoid polypectomy     References:    Click here for ordering Quick Tip   Question Answer Comment   Procedure Type: Gastrointestinal tract    Which provider would you like to cc? ROBERT JOAQUIN    Release to patient Immediate        06/10/22 0732                Discharge Note    SUMMARY     Admit Date: 6/10/2022    Discharge Date and Time: 6/10/2022 7:36 AM    Hospital Course Patient was seen in the preoperative area by both myself and anesthesia. All consents were verified and all questions appropriately answered. All risks, benefits and alternatives explained to patient. Patient proceeded to endoscopy suite for colonoscopy and was discharged home postoperative once cleared by anesthesia.    Final Diagnosis: Post-Op Diagnosis Codes:     * Colon cancer screening [Z12.11]    Disposition: Home or Self Care    Follow Up/Patient Instructions: See Provation report    Medications:  Reconciled Home Medications:      Medication List      CONTINUE taking these medications    atorvastatin 10 MG tablet  Commonly known as: LIPITOR  Take 1 tablet (10 mg  total) by mouth once daily.     losartan-hydrochlorothiazide 100-25 mg 100-25 mg per tablet  Commonly known as: HYZAAR  TAKE 1 TABLET BY MOUTH EVERY DAY        STOP taking these medications    pantoprazole 40 MG tablet  Commonly known as: PROTONIX          Discharge Procedure Orders   Diet general     Call MD for:  temperature >100.4     Call MD for:  persistent nausea and vomiting     Call MD for:  severe uncontrolled pain     Call MD for:  difficulty breathing, headache or visual disturbances     Call MD for:  redness, tenderness, or signs of infection (pain, swelling, redness, odor or green/yellow discharge around incision site)     Call MD for:  hives     Call MD for:  persistent dizziness or light-headedness     Call MD for:  extreme fatigue     Activity as tolerated      Follow-up Information     Fazal Forbes MD Follow up.    Specialty: Internal Medicine  Why: As needed  Contact information:  [address] [address] [phone]

## 2022-06-10 NOTE — PLAN OF CARE
DR WEST AT BEDSIDE TO SPEAK TO PT. REGARDING RESULTS.  VSS, NO GI BLEEDING, NO ABD. PAIN, NO N/V. PT. DISCHARGED FROM UNIT.

## 2022-06-10 NOTE — H&P
[facility] ([facility])  Colon and Rectal Surgery  History & Physical    Patient Name: Manjit Silverio  MRN: [id number]  Admission Date: 6/10/2022  Attending Physician: Randy Joaquin MD  Primary Care Provider: Fazal Forbes MD    Patient information was obtained from patient and medical records.    Subjective:     Chief Complaint/Reason for Admission: Here for Colonoscopy    History of Present Illness:  Patient is a 46 y.o. male presents for colonoscopy. Never had colonoscopy. No hematochezia, melena or change in bowel habits. No personal or fam hx of CRC, polyps or IBD.    No current facility-administered medications on file prior to encounter.     Current Outpatient Medications on File Prior to Encounter   Medication Sig    atorvastatin (LIPITOR) 10 MG tablet Take 1 tablet (10 mg total) by mouth once daily.    pantoprazole (PROTONIX) 40 MG tablet Take 1 tablet (40 mg total) by mouth every morning. (Patient not taking: Reported on 4/26/2022)       Review of patient's allergies indicates:  No Known Allergies    Past Medical History:   Diagnosis Date    Hypertension      History reviewed. No pertinent surgical history.  Family History     Problem Relation (Age of Onset)    Stroke Father        Tobacco Use    Smoking status: Never Smoker    Smokeless tobacco: Never Used   Substance and Sexual Activity    Alcohol use: No    Drug use: Not on file    Sexual activity: Not on file     Review of Systems   Constitutional: Negative for activity change, appetite change, chills, fatigue, fever and unexpected weight change.   HENT: Negative for congestion, ear pain, sore throat and trouble swallowing.    Eyes: Negative for pain, redness and itching.   Respiratory: Negative for cough, shortness of breath and wheezing.    Cardiovascular: Negative for chest pain, palpitations and leg swelling.   Gastrointestinal: Negative for abdominal distention, abdominal pain, anal bleeding, blood in stool, constipation, diarrhea,  nausea, rectal pain and vomiting.   Endocrine: Negative for cold intolerance, heat intolerance and polyuria.   Genitourinary: Negative for dysuria, flank pain, frequency and hematuria.   Musculoskeletal: Negative for gait problem, joint swelling and neck pain.   Skin: Negative for color change, rash and wound.   Allergic/Immunologic: Negative for environmental allergies and immunocompromised state.   Neurological: Negative for dizziness, speech difficulty, weakness and numbness.   Psychiatric/Behavioral: Negative for agitation, confusion and hallucinations.     Objective:     Vital Signs (Most Recent):  Pulse: 61 (06/10/22 0701)  Resp: 17 (06/10/22 0701)  BP: (!) 151/69 (06/10/22 0701)  SpO2: 98 % (06/10/22 0701) Vital Signs (24h Range):  Pulse:  [61] 61  Resp:  [17] 17  SpO2:  [98 %] 98 %  BP: (151)/(69) 151/69     Weight: 117.9 kg (260 lb)  Body mass index is 44.63 kg/m².    Physical Exam  Constitutional:       Appearance: He is well-developed.   HENT:      Head: Normocephalic and atraumatic.   Eyes:      Conjunctiva/sclera: Conjunctivae normal.   Neck:      Thyroid: No thyromegaly.   Cardiovascular:      Rate and Rhythm: Normal rate and regular rhythm.   Pulmonary:      Effort: Pulmonary effort is normal. No respiratory distress.   Abdominal:      General: There is no distension.      Palpations: Abdomen is soft. There is no mass.      Tenderness: There is no abdominal tenderness.   Musculoskeletal:         General: No tenderness. Normal range of motion.      Cervical back: Normal range of motion.   Skin:     General: Skin is warm and dry.      Capillary Refill: Capillary refill takes less than 2 seconds.      Findings: No rash.   Neurological:      Mental Status: He is alert and oriented to person, place, and time.           Assessment/Plan:     Patient is a 46 y.o. male who presents for colonoscopy     - Ok to proceed to endoscopy suite for colonoscopy  - Consent obtained. All risks, benefits and alternatives  fully explained to patient, including but not limited to bleeding, infection, perforation, and missed polyps. All questions appropriately answered to patient's satisfaction. Consent signed and placed on chart.    There are no hospital problems to display for this patient.    VTE Risk Mitigation (From admission, onward)    None          Randy Joaquin MD  Colon and Rectal Surgery  [facility] ([facility])

## 2022-06-10 NOTE — [employer] PATIENT INSTRUCTIONS
Discharge Summary/Instructions after an Endoscopic Procedure  Patient Name: Manjit Silverio  Patient MRN: [id number]  Patient YOB: 1975  Friday, Sophy 10, 2022 Randy Joaquin MD  Dear patient,  As a result of recent federal legislation (The Federal Cures Act), you may   receive lab or pathology results from your procedure in your [facility]   account before your physician is able to contact you. Your physician or   their representative will relay the results to you with their   recommendations at their soonest availability.  Thank you,  RESTRICTIONS:  During your procedure today, you received medications for sedation.  These   medications may affect your judgment, balance and coordination.  Therefore,   for 24 hours, you have the following restrictions:   - DO NOT drive a car, operate machinery, make legal/financial decisions,   sign important papers or drink alcohol.    ACTIVITY:  Today: no heavy lifting, straining or running due to procedural   sedation/anesthesia.  The following day: return to full activity including work.  DIET:  Eat and drink normally unless instructed otherwise.     TREATMENT FOR COMMON SIDE EFFECTS:  - Mild abdominal pain, nausea, belching, bloating or excessive gas:  rest,   eat lightly and use a heating pad.  - Sore Throat: treat with throat lozenges and/or gargle with warm salt   water.  - Because air was used during the procedure, expelling large amounts of air   from your rectum or belching is normal.  - If a bowel prep was taken, you may not have a bowel movement for 1-3 days.    This is normal.  SYMPTOMS TO WATCH FOR AND REPORT TO YOUR PHYSICIAN:  1. Abdominal pain or bloating, other than gas cramps.  2. Chest pain.  3. Back pain.  4. Signs of infection such as: chills or fever occurring within 24 hours   after the procedure.  5. Rectal bleeding, which would show as bright red, maroon, or black stools.   (A tablespoon of blood from the rectum is not serious, especially  if   hemorrhoids are present.)  6. Vomiting.  7. Weakness or dizziness.  GO DIRECTLY TO THE NEAREST EMERGENCY ROOM IF YOU HAVE ANY OF THE FOLLOWING:      Difficulty breathing              Chills and/or fever over 101 F   Persistent vomiting and/or vomiting blood   Severe abdominal pain   Severe chest pain   Black, tarry stools   Bleeding- more than one tablespoon   Any other symptom or condition that you feel may need urgent attention  Your doctor recommends these additional instructions:  If any biopsies were taken, your doctors clinic will contact you in 1 to 2   weeks with any results.  - Discharge patient to home.   - High fiber diet.   - Continue present medications.   - Await pathology results.   - Repeat colonoscopy in 5 years for surveillance.   - Return to primary care physician PRN.  For questions, problems or results please call your physician Randy Joaquin MD at Work:  [phone]  If you have any questions about the above instructions, call the GI   department at [phone] or call the endoscopy unit at [phone]   from 7am until 3 pm.  [facility], EMERGENCY ROOM PHONE NUMBER:   [phone]  IF A COMPLICATION OR EMERGENCY SITUATION ARISES AND YOU ARE UNABLE TO REACH   YOUR PHYSICIAN - GO DIRECTLY TO THE EMERGENCY ROOM.  I have read or have had read to me these discharge instructions for my   procedure and have received a written copy.  I understand these   instructions and will follow-up with my physician if I have any questions.     __________________________________       _____________________________________  Nurse Signature                                          Patient/Designated   Responsible Party Signature  MD Randy Johnson MD  6/10/2022 7:35:51 AM  This report has been verified and signed electronically.  Dear patient,  As a result of recent federal legislation (The Federal Cures Act), you may   receive lab or pathology  results from your procedure in your [facility]   account before your physician is able to contact you. Your physician or   their representative will relay the results to you with their   recommendations at their soonest availability.  Thank you,  [employer]ATION

## 2022-06-10 NOTE — TRANSFER OF CARE
"Anesthesia Transfer of Care Note    Patient: Manjit Silverio    Procedure(s) Performed: Procedure(s) (LRB):  COLONOSCOPY (N/A)    Patient location: GI    Anesthesia Type: MAC    Transport from OR: Transported from OR on room air with adequate spontaneous ventilation    Post pain: adequate analgesia    Post assessment: no apparent anesthetic complications    Post vital signs: stable    Level of consciousness: awake and responds to stimulation    Nausea/Vomiting: no nausea/vomiting    Complications: none    Transfer of care protocol was followed      Last vitals:   Visit Vitals  BP (!) 151/69 (BP Location: Left arm, Patient Position: Lying)   Pulse 61   Resp 17   Ht 5' 4" (1.626 m)   Wt 117.9 kg (260 lb)   SpO2 98%   BMI 44.63 kg/m²     "

## 2022-06-10 NOTE — ANESTHESIA POSTPROCEDURE EVALUATION
Anesthesia Post Evaluation    Patient: Manjit Silverio    Procedure(s) Performed: Procedure(s) (LRB):  COLONOSCOPY (N/A)    Final Anesthesia Type: MAC      Patient location during evaluation: GI PACU  Patient participation: Yes- Able to Participate  Level of consciousness: awake and alert  Post-procedure vital signs: reviewed and stable  Pain management: adequate  Airway patency: patent    PONV status at discharge: No PONV  Anesthetic complications: no      Cardiovascular status: stable  Respiratory status: unassisted and spontaneous ventilation  Hydration status: euvolemic  Follow-up not needed.          Vitals Value Taken Time   BP  06/10/22 0737   Temp  06/10/22 0737   Pulse  06/10/22 0737   Resp  06/10/22 0737   SpO2  06/10/22 0737         No case tracking events are documented in the log.      Pain/Jg Score: No data recorded

## 2022-06-10 NOTE — ANESTHESIA PREPROCEDURE EVALUATION
06/10/2022  Manjit Silverio is a 46 y.o., male.      Pre-op Assessment    I have reviewed the Patient Summary Reports.     I have reviewed the Nursing Notes. I have reviewed the NPO Status.   I have reviewed the Medications.     Review of Systems  Anesthesia Hx:  No problems with previous Anesthesia    Hematology/Oncology:  Hematology Normal   Oncology Normal     EENT/Dental:EENT/Dental Normal   Cardiovascular:   Hypertension Dysrhythmias    Pulmonary:  Pulmonary Normal    Renal/:  Renal/ Normal     Hepatic/GI:   GERD    Musculoskeletal:  Musculoskeletal Normal    Neurological:  Neurology Normal    Endocrine:  Endocrine Normal  Obesity / BMI > 30  Dermatological:  Skin Normal    Psych:  Psychiatric Normal           Physical Exam  General: Well nourished, Alert, Oriented and Cooperative    Airway:  Mallampati: II   Mouth Opening: Normal  TM Distance: Normal  Tongue: Normal  Neck ROM: Normal ROM    Dental:  Intact    Chest/Lungs:  Clear to auscultation, Normal Respiratory Rate    Heart:  Rate: Normal    Abdomen:  Normal        Anesthesia Plan  Type of Anesthesia, risks & benefits discussed:    Anesthesia Type: MAC  Intra-op Monitoring Plan: Standard ASA Monitors  Induction:  IV  Informed Consent: Informed consent signed with the Patient and all parties understand the risks and agree with anesthesia plan.  All questions answered.   ASA Score: 2  Day of Surgery Review of History & Physical: I have interviewed and examined the patient. I have reviewed the patient's H&P dated:     Ready For Surgery From Anesthesia Perspective.     .

## 2022-06-16 ENCOUNTER — TELEPHONE (OUTPATIENT)
Dept: FAMILY MEDICINE | Facility: CLINIC | Age: 47
End: 2022-06-16
Payer: COMMERCIAL

## 2022-06-16 RX ORDER — PROMETHAZINE HYDROCHLORIDE AND DEXTROMETHORPHAN HYDROBROMIDE 6.25; 15 MG/5ML; MG/5ML
5 SYRUP ORAL EVERY 6 HOURS PRN
Qty: 240 ML | Refills: 0 | Status: SHIPPED | OUTPATIENT
Start: 2022-06-16 | End: 2022-06-26

## 2022-06-16 NOTE — TELEPHONE ENCOUNTER
Spoke with pt he stated that he has been having chest congestion and runny nose and cough pt stated he he has been taking otc medication but it is not helping and he would like to know what  suggest he do

## 2022-06-16 NOTE — TELEPHONE ENCOUNTER
Pt did an at home covid test. It is negative. Pt would like to know what to do about his symptoms. He's experiencing chest congestion, runny nose and cough. OTC meds taken without relief.

## 2022-06-16 NOTE — TELEPHONE ENCOUNTER
Prescription cough medicine sent to pharmacy. Use mucinex D for congestion. Use allegra or claritin for runny nose.

## 2022-06-16 NOTE — TELEPHONE ENCOUNTER
----- Message from Marjorie Jacob sent at 6/16/2022  8:06 AM CDT -----  Regarding: Call back  Contact: [phone]  Type:  Same Day Appointment Request    Caller is requesting a same day appointment.  Caller declined first available appointment listed below.    Name of Caller: PT   When is the first available appointment? July   Symptoms: Upper raspatory infection   Best Call Back Number: [phone]  Additional Information:

## 2022-06-16 NOTE — TELEPHONE ENCOUNTER
Pt notified that promethazine cough syrup was sent to pharmacy, and for pt to use allegra and claritin for runny nose, and mucinex for congestion

## 2022-06-16 NOTE — TELEPHONE ENCOUNTER
----- Message from Marjorie Jacob sent at 6/16/2022 11:46 AM CDT -----  Regarding: call back  Contact: [phone]  Who Called: PT     Patient is calling to talk to nurse in regards to his COVI19 test is negative.

## 2022-06-17 LAB
FINAL PATHOLOGIC DIAGNOSIS: NORMAL
GROSS: NORMAL
Lab: NORMAL

## 2022-06-27 ENCOUNTER — OFFICE VISIT (OUTPATIENT)
Dept: SPORTS MEDICINE | Facility: CLINIC | Age: 47
End: 2022-06-27
Payer: COMMERCIAL

## 2022-06-27 VITALS
WEIGHT: 252.88 LBS | DIASTOLIC BLOOD PRESSURE: 83 MMHG | HEIGHT: 64 IN | BODY MASS INDEX: 43.17 KG/M2 | HEART RATE: 88 BPM | SYSTOLIC BLOOD PRESSURE: 127 MMHG

## 2022-06-27 DIAGNOSIS — S83.241A OTHER TEAR OF MEDIAL MENISCUS OF RIGHT KNEE AS CURRENT INJURY, INITIAL ENCOUNTER: Primary | ICD-10-CM

## 2022-06-27 PROCEDURE — 1160F RVW MEDS BY RX/DR IN RCRD: CPT | Mod: CPTII,S$GLB,, | Performed by: STUDENT IN AN ORGANIZED HEALTH CARE EDUCATION/TRAINING PROGRAM

## 2022-06-27 PROCEDURE — 99213 PR OFFICE/OUTPT VISIT, EST, LEVL III, 20-29 MIN: ICD-10-PCS | Mod: S$GLB,,, | Performed by: STUDENT IN AN ORGANIZED HEALTH CARE EDUCATION/TRAINING PROGRAM

## 2022-06-27 PROCEDURE — 99999 PR PBB SHADOW E&M-EST. PATIENT-LVL III: CPT | Mod: PBBFAC,,, | Performed by: STUDENT IN AN ORGANIZED HEALTH CARE EDUCATION/TRAINING PROGRAM

## 2022-06-27 PROCEDURE — 3079F DIAST BP 80-89 MM HG: CPT | Mod: CPTII,S$GLB,, | Performed by: STUDENT IN AN ORGANIZED HEALTH CARE EDUCATION/TRAINING PROGRAM

## 2022-06-27 PROCEDURE — 1159F PR MEDICATION LIST DOCUMENTED IN MEDICAL RECORD: ICD-10-PCS | Mod: CPTII,S$GLB,, | Performed by: STUDENT IN AN ORGANIZED HEALTH CARE EDUCATION/TRAINING PROGRAM

## 2022-06-27 PROCEDURE — 1159F MED LIST DOCD IN RCRD: CPT | Mod: CPTII,S$GLB,, | Performed by: STUDENT IN AN ORGANIZED HEALTH CARE EDUCATION/TRAINING PROGRAM

## 2022-06-27 PROCEDURE — 99999 PR PBB SHADOW E&M-EST. PATIENT-LVL III: ICD-10-PCS | Mod: PBBFAC,,, | Performed by: STUDENT IN AN ORGANIZED HEALTH CARE EDUCATION/TRAINING PROGRAM

## 2022-06-27 PROCEDURE — 3079F PR MOST RECENT DIASTOLIC BLOOD PRESSURE 80-89 MM HG: ICD-10-PCS | Mod: CPTII,S$GLB,, | Performed by: STUDENT IN AN ORGANIZED HEALTH CARE EDUCATION/TRAINING PROGRAM

## 2022-06-27 PROCEDURE — 1160F PR REVIEW ALL MEDS BY PRESCRIBER/CLIN PHARMACIST DOCUMENTED: ICD-10-PCS | Mod: CPTII,S$GLB,, | Performed by: STUDENT IN AN ORGANIZED HEALTH CARE EDUCATION/TRAINING PROGRAM

## 2022-06-27 PROCEDURE — 3008F PR BODY MASS INDEX (BMI) DOCUMENTED: ICD-10-PCS | Mod: CPTII,S$GLB,, | Performed by: STUDENT IN AN ORGANIZED HEALTH CARE EDUCATION/TRAINING PROGRAM

## 2022-06-27 PROCEDURE — 99213 OFFICE O/P EST LOW 20 MIN: CPT | Mod: S$GLB,,, | Performed by: STUDENT IN AN ORGANIZED HEALTH CARE EDUCATION/TRAINING PROGRAM

## 2022-06-27 PROCEDURE — 3074F PR MOST RECENT SYSTOLIC BLOOD PRESSURE < 130 MM HG: ICD-10-PCS | Mod: CPTII,S$GLB,, | Performed by: STUDENT IN AN ORGANIZED HEALTH CARE EDUCATION/TRAINING PROGRAM

## 2022-06-27 PROCEDURE — 3074F SYST BP LT 130 MM HG: CPT | Mod: CPTII,S$GLB,, | Performed by: STUDENT IN AN ORGANIZED HEALTH CARE EDUCATION/TRAINING PROGRAM

## 2022-06-27 PROCEDURE — 3008F BODY MASS INDEX DOCD: CPT | Mod: CPTII,S$GLB,, | Performed by: STUDENT IN AN ORGANIZED HEALTH CARE EDUCATION/TRAINING PROGRAM

## 2022-06-27 NOTE — PROGRESS NOTES
Subjective:          Chief Complaint: Manjit Silverio is a 46 y.o. male who had concerns including Follow-up of the Right Knee.    Manjit Silverio is a 46 y.o. male  presents for follow up evaluation of his right knee.  He is doing well. He states that he is pain free since his last visit. At his last visit he received a corticosteroid injection which he states has given him great relief that he states he is still feeling the positive effect from this. He has completed formal physical therapy as of about 2 weeks ago is still doing his home exercises. He reports that he is back to normal activity and is pain free. He is pleased with his progress.    Follow-up  Pertinent negatives include no joint swelling.     Past Medical History:   Diagnosis Date    Hypertension        Current Outpatient Medications on File Prior to Visit   Medication Sig Dispense Refill    atorvastatin (LIPITOR) 10 MG tablet Take 1 tablet (10 mg total) by mouth once daily. 90 tablet 4    losartan-hydrochlorothiazide 100-25 mg (HYZAAR) 100-25 mg per tablet TAKE 1 TABLET BY MOUTH EVERY DAY 90 tablet 4    [] promethazine-dextromethorphan (PROMETHAZINE-DM) 6.25-15 mg/5 mL Syrp Take 5 mLs by mouth every 6 (six) hours as needed (cough). 240 mL 0    [DISCONTINUED] pantoprazole (PROTONIX) 40 MG tablet Take 1 tablet (40 mg total) by mouth every morning. (Patient not taking: Reported on 2022) 90 tablet 3     No current facility-administered medications on file prior to visit.       Past Surgical History:   Procedure Laterality Date    COLONOSCOPY N/A 6/10/2022    Procedure: COLONOSCOPY;  Surgeon: Randy Joaquin MD;  Location: [facility];  Service: General;  Laterality: N/A;       Family History   Problem Relation Age of Onset    Stroke Father        Social History     Socioeconomic History    Marital status:    Tobacco Use    Smoking status: Never Smoker    Smokeless tobacco: Never Used   Substance and Sexual  Activity    Alcohol use: No     Social Determinants of Health     Financial Resource Strain: Low Risk     Difficulty of Paying Living Expenses: Not hard at all   Food Insecurity: No Food Insecurity    Worried About Running Out of Food in the Last Year: Never true    Ran Out of Food in the Last Year: Never true   Transportation Needs: No Transportation Needs    Lack of Transportation (Medical): No    Lack of Transportation (Non-Medical): No   Physical Activity: Inactive    Days of Exercise per Week: 0 days    Minutes of Exercise per Session: 0 min   Stress: No Stress Concern Present    Feeling of Stress : Not at all   Social Connections: Unknown    Frequency of Communication with Friends and Family: More than three times a week    Frequency of Social Gatherings with Friends and Family: Never    Active Member of Clubs or Organizations: No    Attends Club or Organization Meetings: Never    Marital Status:    Housing Stability: Low Risk     Unable to Pay for Housing in the Last Year: No    Number of Places Lived in the Last Year: 1    Unstable Housing in the Last Year: No       Review of Systems   Constitutional: Negative.   HENT: Negative.    Eyes: Negative.    Cardiovascular: Negative.    Respiratory: Negative.    Endocrine: Negative.    Hematologic/Lymphatic: Negative.    Skin: Negative.    Musculoskeletal: Positive for arthritis. Negative for back pain, falls, joint pain, joint swelling, muscle weakness and stiffness.   Neurological: Negative.    Psychiatric/Behavioral: Negative.    Allergic/Immunologic: Negative.                    Objective:        General: Manjit is well-developed, well-nourished, appears stated age, in no acute distress, alert and oriented to time, place and person.     General    Nursing note and vitals reviewed.  Constitutional: He is oriented to person, place, and time. He appears well-developed and well-nourished. No distress.   HENT:   Head: Normocephalic and  atraumatic.   Nose: Nose normal.   Eyes: EOM are normal.   Cardiovascular: Intact distal pulses.    Pulmonary/Chest: Effort normal. No respiratory distress.   Neurological: He is alert and oriented to person, place, and time.   Psychiatric: He has a normal mood and affect. His behavior is normal. Judgment and thought content normal.     General Musculoskeletal Exam   Gait: normal       Right Knee Exam     Inspection   Erythema: absent  Scars: absent  Swelling: absent  Effusion: absent  Deformity: absent  Bruising: absent    Tenderness   The patient is experiencing no tenderness.     Crepitus   The patient has crepitus of the patella.    Range of Motion   Extension: -5   Flexion: 130     Tests   Meniscus   Vinnie:  Medial - negative Lateral - negative  Ligament Examination Lachman: normal (-1 to 2mm) PCL-Posterior Drawer: normal (0 to 2mm)     MCL - Valgus: normal (0 to 2mm)  LCL - Varus: normal  Patella   Patellar apprehension: negative  Passive Patellar Tilt: neutral  Patellar Tracking: normal  Patellar Grind: positive    Other   Sensation: normal    Left Knee Exam     Inspection   Erythema: absent  Scars: absent  Swelling: absent  Effusion: absent  Deformity: absent  Bruising: absent    Tenderness   The patient is experiencing no tenderness.     Crepitus   The patient has crepitus of the patella.    Range of Motion   Extension: -5   Flexion: 130     Tests   Meniscus   Vinnie:  Medial - negative Lateral - negative  Stability Lachman: normal (-1 to 2mm) PCL-Posterior Drawer: normal (0 to 2mm)  MCL - Valgus: normal (0 to 2mm)  LCL - Varus: normal (0 to 2mm)  Patella   Patellar apprehension: negative  Passive Patellar Tilt: neutral  Patellar Tracking: normal  Patellar Grind: positive    Other   Sensation: normal    Muscle Strength   Right Lower Extremity   Quadriceps:  5/5   Hamstrin/5   Left Lower Extremity   Quadriceps:  5/5   Hamstrin/5     Vascular Exam     Right Pulses  Dorsalis Pedis:       2+  Posterior Tibial:      2+        Left Pulses  Dorsalis Pedis:      2+  Posterior Tibial:      2+        Edema  Right Lower Leg: absent  Left Lower Leg: absent    Imaging:  X-rays of the bilateral knees from 04/26/2022 personally reviewed by me on that day.  This could weight-bearing AP, PA flexion, lateral, Merchant views.  There is mild arthritic change of the bilateral medial compartments.  The patellofemoral Homans bilaterally has mild-to-moderate arthritic changes most severe on the left with osteophyte formation.  Overall, the joint spaces in all 3 compartments are preserved.    MRI the right knee from 04/18/2022 personally reviewed by me 04/26/2022.  There was a radial tear of the posterior horn of medial meniscus.  There is no extrusion of the meniscus in the gutter.  The root of the medial meniscus is intact.  Lateral meniscus is intact.  The ACL, PCL and collateral ligaments are intact.  There is high-grade partial to full-thickness chondromalacia in the medial and patellofemoral compartments.          Assessment:     Manjit Silverio is a 46 y.o. male with right knee medial meniscus tear as well as chondromalacia and has made great improvement with physical therapy.  Encounter Diagnosis   Name Primary?    Other tear of medial meniscus of right knee as current injury, initial encounter Yes          Plan:       He has done excellent.  He will continue his home exercise program.  He can return to clinic on an as-needed basis.    All of their questions were answered.  They will call the clinic with any questions or concerns in the interim.    Should the patient's symptoms worsen, persist, or fail to improve they should return for reevaluation and I would be happy to see them back anytime.        Barrington Copeland M.D.     Please be aware that this note has been generated with the assistance of [employer] voice-to-text.  Please excuse any spelling or grammatical errors.    Thank you for choosing Dr. Barrington Copeland  for your sports medicine care. It is our goal to provide you with exceptional care that will help keep you healthy, active, and get you back in the game.     If you felt that you received exemplary care today, please consider leaving feedback for Dr. Copeland on [email or web address] at [email or web address]    Please do not hesitate to reach out to us via email, phone, or MyChart with any questions, concerns, or feedback.

## 2022-09-14 DIAGNOSIS — I10 ESSENTIAL HYPERTENSION: ICD-10-CM

## 2022-12-20 ENCOUNTER — OFFICE VISIT (OUTPATIENT)
Dept: FAMILY MEDICINE | Facility: CLINIC | Age: 47
End: 2022-12-20
Payer: COMMERCIAL

## 2022-12-20 DIAGNOSIS — K21.9 GASTROESOPHAGEAL REFLUX DISEASE, UNSPECIFIED WHETHER ESOPHAGITIS PRESENT: ICD-10-CM

## 2022-12-20 PROCEDURE — 1159F PR MEDICATION LIST DOCUMENTED IN MEDICAL RECORD: ICD-10-PCS | Mod: CPTII,95,, | Performed by: STUDENT IN AN ORGANIZED HEALTH CARE EDUCATION/TRAINING PROGRAM

## 2022-12-20 PROCEDURE — 1160F RVW MEDS BY RX/DR IN RCRD: CPT | Mod: CPTII,95,, | Performed by: STUDENT IN AN ORGANIZED HEALTH CARE EDUCATION/TRAINING PROGRAM

## 2022-12-20 PROCEDURE — 1159F MED LIST DOCD IN RCRD: CPT | Mod: CPTII,95,, | Performed by: STUDENT IN AN ORGANIZED HEALTH CARE EDUCATION/TRAINING PROGRAM

## 2022-12-20 PROCEDURE — 99213 OFFICE O/P EST LOW 20 MIN: CPT | Mod: 95,,, | Performed by: STUDENT IN AN ORGANIZED HEALTH CARE EDUCATION/TRAINING PROGRAM

## 2022-12-20 PROCEDURE — 99213 PR OFFICE/OUTPT VISIT, EST, LEVL III, 20-29 MIN: ICD-10-PCS | Mod: 95,,, | Performed by: STUDENT IN AN ORGANIZED HEALTH CARE EDUCATION/TRAINING PROGRAM

## 2022-12-20 PROCEDURE — 1160F PR REVIEW ALL MEDS BY PRESCRIBER/CLIN PHARMACIST DOCUMENTED: ICD-10-PCS | Mod: CPTII,95,, | Performed by: STUDENT IN AN ORGANIZED HEALTH CARE EDUCATION/TRAINING PROGRAM

## 2022-12-20 RX ORDER — PANTOPRAZOLE SODIUM 40 MG/1
40 TABLET, DELAYED RELEASE ORAL EVERY MORNING
Qty: 90 TABLET | Refills: 3 | Status: SHIPPED | OUTPATIENT
Start: 2022-12-20 | End: 2023-03-20

## 2022-12-20 NOTE — PROGRESS NOTES
Primary Care Virtual Visit    The patient location is: [place]  The chief complaint leading to consultation is:  burping  Visit type: Virtual visit with synchronous audio and video  Total time spent with patient:  6 minutes  Each patient to whom he or she provides medical services by telemedicine is:  (1) informed of the relationship between the physician and patient and the respective role of any other health care provider with respect to management of the patient; and (2) notified that he or she may decline to receive medical services by telemedicine and may withdraw from such care at any time.     Patient ID: Manjit Silverio is a 47 y.o. male.     Cough  This is a recurrent problem. The current episode started more than 1 year ago. The problem has been gradually worsening. The problem occurs constantly. The cough is Non-productive. Associated symptoms include heartburn. Pertinent negatives include no chest pain, chills, ear congestion, ear pain, fever, headaches, hemoptysis, myalgias, nasal congestion, postnasal drip, rash, rhinorrhea, sore throat, shortness of breath, sweats, weight loss or wheezing. Nothing aggravates the symptoms. The treatment provided significant relief. There is no history of asthma, bronchiectasis, bronchitis, COPD, emphysema, environmental allergies or pneumonia.        Review of Systems  Review of Systems   Constitutional:  Negative for chills, fever and weight loss.   HENT:  Negative for ear pain, postnasal drip, rhinorrhea and sore throat.    Respiratory:  Positive for cough. Negative for hemoptysis, shortness of breath and wheezing.    Cardiovascular:  Negative for chest pain.   Gastrointestinal:  Positive for heartburn.   Musculoskeletal:  Negative for myalgias.   Skin:  Negative for rash.   Neurological:  Negative for headaches.   Endo/Heme/Allergies:  Negative for environmental allergies.     Currently Medications  Current Outpatient Medications on File Prior to Visit    Medication Sig Dispense Refill    atorvastatin (LIPITOR) 10 MG tablet TAKE 1 TABLET BY MOUTH EVERY DAY 90 tablet 4    losartan-hydrochlorothiazide 100-25 mg (HYZAAR) 100-25 mg per tablet TAKE 1 TABLET BY MOUTH EVERY DAY 90 tablet 4     No current facility-administered medications on file prior to visit.       Physical  Exam  There were no vitals filed for this visit.   Physical Exam    Labs:    Complete Blood Count  Lab Results   Component Value Date    RBC 4.92 08/13/2021    HGB 14.2 08/13/2021    HCT 43.4 08/13/2021    MCV 88 08/13/2021    MCH 28.9 08/13/2021    MCHC 32.7 08/13/2021    RDW 13.3 08/13/2021     08/13/2021    MPV 11.3 08/13/2021    GRAN 2.4 08/13/2021    GRAN 40.3 08/13/2021    LYMPH 2.8 08/13/2021    LYMPH 47.6 08/13/2021    MONO 0.6 08/13/2021    MONO 9.8 08/13/2021    EOS 0.1 08/13/2021    BASO 0.05 08/13/2021    EOSINOPHIL 1.2 08/13/2021    BASOPHIL 0.9 08/13/2021    DIFFMETHOD Automated 08/13/2021       Comprehensive Metabolic Panel  No results found for: GLU, BUN, CREATININE, NA, K, CL, PROT, ALBUMIN, BILITOT, AST, ALKPHOS, CO2, ALT, ANIONGAP, EGFRNONAA, ESTGFRAFRICA    TSH  No results found for: TSH    Imaging:  X-ray Knee Ortho Bilateral with Flexion  Narrative: EXAMINATION:  XR KNEE ORTHO BILAT WITH FLEXION    CLINICAL HISTORY:  Pain in right knee    TECHNIQUE:  AP standing of both knees, PA flexion standing views of both knees, and Merchant views of both knees were performed.  Lateral views of both knees were also performed.    COMPARISON:  04/11/2022    FINDINGS:  Right knee: There is a probable small persistent joint effusion present which appears somewhat improved from prior.  No acute fractures or dislocations visualized.  Small marginal osteophytes are present in the patellofemoral and medial compartments with otherwise preserved joint spaces.    Left knee:  There is no radiographic evidence of acute osseous, articular, or soft tissue abnormality. Small tricompartmental  marginal osteophytes are present with slight joint space narrowing in the medial compartment.  Impression: As above    Electronically signed by: Randy Mccord MD  Date:    04/26/2022  Time:    11:01      Assessment/Plan:    Problem List Items Addressed This Visit          GI    Gastroesophageal reflux disease    Relevant Medications    pantoprazole (PROTONIX) 40 MG tablet       Patient was previously prescribed protonix, but he never picked it up from the pharmacy. Will resend prescription    Fazal Forbes MD

## 2022-12-28 ENCOUNTER — OFFICE VISIT (OUTPATIENT)
Dept: FAMILY MEDICINE | Facility: CLINIC | Age: 47
End: 2022-12-28
Payer: COMMERCIAL

## 2022-12-28 VITALS
TEMPERATURE: 98 F | BODY MASS INDEX: 44.94 KG/M2 | SYSTOLIC BLOOD PRESSURE: 118 MMHG | WEIGHT: 263.25 LBS | HEART RATE: 102 BPM | OXYGEN SATURATION: 97 % | DIASTOLIC BLOOD PRESSURE: 80 MMHG | HEIGHT: 64 IN

## 2022-12-28 DIAGNOSIS — U07.1 COVID-19: Primary | ICD-10-CM

## 2022-12-28 DIAGNOSIS — M79.10 MYALGIA: ICD-10-CM

## 2022-12-28 DIAGNOSIS — J02.9 PHARYNGITIS, UNSPECIFIED ETIOLOGY: ICD-10-CM

## 2022-12-28 LAB
CTP QC/QA: YES
CTP QC/QA: YES
POC MOLECULAR INFLUENZA A AGN: NEGATIVE
POC MOLECULAR INFLUENZA B AGN: NEGATIVE
SARS-COV-2 RDRP RESP QL NAA+PROBE: POSITIVE

## 2022-12-28 PROCEDURE — 99214 PR OFFICE/OUTPT VISIT, EST, LEVL IV, 30-39 MIN: ICD-10-PCS | Mod: S$GLB,,, | Performed by: STUDENT IN AN ORGANIZED HEALTH CARE EDUCATION/TRAINING PROGRAM

## 2022-12-28 PROCEDURE — 3079F DIAST BP 80-89 MM HG: CPT | Mod: CPTII,S$GLB,, | Performed by: STUDENT IN AN ORGANIZED HEALTH CARE EDUCATION/TRAINING PROGRAM

## 2022-12-28 PROCEDURE — 87502 INFLUENZA DNA AMP PROBE: CPT | Mod: QW,,, | Performed by: STUDENT IN AN ORGANIZED HEALTH CARE EDUCATION/TRAINING PROGRAM

## 2022-12-28 PROCEDURE — 1160F RVW MEDS BY RX/DR IN RCRD: CPT | Mod: CPTII,S$GLB,, | Performed by: STUDENT IN AN ORGANIZED HEALTH CARE EDUCATION/TRAINING PROGRAM

## 2022-12-28 PROCEDURE — 87635 SARS-COV-2 COVID-19 AMP PRB: CPT | Mod: QW,S$GLB,, | Performed by: STUDENT IN AN ORGANIZED HEALTH CARE EDUCATION/TRAINING PROGRAM

## 2022-12-28 PROCEDURE — 1159F PR MEDICATION LIST DOCUMENTED IN MEDICAL RECORD: ICD-10-PCS | Mod: CPTII,S$GLB,, | Performed by: STUDENT IN AN ORGANIZED HEALTH CARE EDUCATION/TRAINING PROGRAM

## 2022-12-28 PROCEDURE — 1159F MED LIST DOCD IN RCRD: CPT | Mod: CPTII,S$GLB,, | Performed by: STUDENT IN AN ORGANIZED HEALTH CARE EDUCATION/TRAINING PROGRAM

## 2022-12-28 PROCEDURE — 87635: ICD-10-PCS | Mod: QW,S$GLB,, | Performed by: STUDENT IN AN ORGANIZED HEALTH CARE EDUCATION/TRAINING PROGRAM

## 2022-12-28 PROCEDURE — 3008F PR BODY MASS INDEX (BMI) DOCUMENTED: ICD-10-PCS | Mod: CPTII,S$GLB,, | Performed by: STUDENT IN AN ORGANIZED HEALTH CARE EDUCATION/TRAINING PROGRAM

## 2022-12-28 PROCEDURE — 1160F PR REVIEW ALL MEDS BY PRESCRIBER/CLIN PHARMACIST DOCUMENTED: ICD-10-PCS | Mod: CPTII,S$GLB,, | Performed by: STUDENT IN AN ORGANIZED HEALTH CARE EDUCATION/TRAINING PROGRAM

## 2022-12-28 PROCEDURE — 99214 OFFICE O/P EST MOD 30 MIN: CPT | Mod: S$GLB,,, | Performed by: STUDENT IN AN ORGANIZED HEALTH CARE EDUCATION/TRAINING PROGRAM

## 2022-12-28 PROCEDURE — 3079F PR MOST RECENT DIASTOLIC BLOOD PRESSURE 80-89 MM HG: ICD-10-PCS | Mod: CPTII,S$GLB,, | Performed by: STUDENT IN AN ORGANIZED HEALTH CARE EDUCATION/TRAINING PROGRAM

## 2022-12-28 PROCEDURE — 3074F PR MOST RECENT SYSTOLIC BLOOD PRESSURE < 130 MM HG: ICD-10-PCS | Mod: CPTII,S$GLB,, | Performed by: STUDENT IN AN ORGANIZED HEALTH CARE EDUCATION/TRAINING PROGRAM

## 2022-12-28 PROCEDURE — 3074F SYST BP LT 130 MM HG: CPT | Mod: CPTII,S$GLB,, | Performed by: STUDENT IN AN ORGANIZED HEALTH CARE EDUCATION/TRAINING PROGRAM

## 2022-12-28 PROCEDURE — 3008F BODY MASS INDEX DOCD: CPT | Mod: CPTII,S$GLB,, | Performed by: STUDENT IN AN ORGANIZED HEALTH CARE EDUCATION/TRAINING PROGRAM

## 2022-12-28 PROCEDURE — 87502 POCT INFLUENZA A/B MOLECULAR: ICD-10-PCS | Mod: QW,,, | Performed by: STUDENT IN AN ORGANIZED HEALTH CARE EDUCATION/TRAINING PROGRAM

## 2022-12-28 NOTE — LETTER
12/28/2022                 [address] [address]  Phone: [phone]  Fax: [phone]   12/28/2022    Patient: Manjit Silverio   YOB: 1975   Date of Visit: 12/28/2022       To Whom it May Concern:    Manjit Silverio was seen in my clinic on 12/28/2022. He may return to work on 1/2/23.    If you have any questions or concerns, please don't hesitate to call.    Sincerely,         Fazal Forbes MD

## 2022-12-28 NOTE — PROGRESS NOTES
Patient ID: Manjit Silverio is a 47 y.o. male.     Chief Complaint: Nasal Congestion, Cough, Night Sweats, and Generalized Body Aches    Cough  This is a new problem. Episode onset: 2 days ago. The problem has been unchanged. The problem occurs every few minutes. The cough is Non-productive. Associated symptoms include chills, myalgias, nasal congestion, a sore throat, sweats and wheezing. Pertinent negatives include no chest pain, ear congestion, ear pain, fever, headaches, hemoptysis, postnasal drip, rash, shortness of breath or weight loss. Nothing aggravates the symptoms. He has tried OTC cough suppressant for the symptoms. The treatment provided no relief.        Review of Systems  Review of Systems   Constitutional:  Positive for chills. Negative for fever and weight loss.   HENT:  Positive for sore throat. Negative for ear pain, hearing loss, postnasal drip and sinus pain.    Eyes:  Negative for discharge.   Respiratory:  Positive for cough and wheezing. Negative for hemoptysis and shortness of breath.    Cardiovascular:  Negative for chest pain, palpitations and leg swelling.   Gastrointestinal:  Negative for blood in stool, constipation, diarrhea, nausea and vomiting.   Genitourinary:  Negative for hematuria and urgency.   Musculoskeletal:  Positive for myalgias. Negative for neck pain.   Skin:  Negative for rash.   Neurological:  Negative for weakness and headaches.   Endo/Heme/Allergies:  Negative for polydipsia.   Psychiatric/Behavioral:  Negative for depression.    All other systems reviewed and are negative.    Currently Medications  Current Outpatient Medications on File Prior to Visit   Medication Sig Dispense Refill    atorvastatin (LIPITOR) 10 MG tablet TAKE 1 TABLET BY MOUTH EVERY DAY 90 tablet 4    losartan-hydrochlorothiazide 100-25 mg (HYZAAR) 100-25 mg per tablet TAKE 1 TABLET BY MOUTH EVERY DAY 90 tablet 4    pantoprazole (PROTONIX) 40 MG tablet Take 1 tablet (40 mg total) by mouth every  "morning. Before breakfast. 90 tablet 3     No current facility-administered medications on file prior to visit.       Physical  Exam  Vitals:    12/28/22 1115   BP: 118/80   Pulse: 102   Temp: 98.4 °F (36.9 °C)   TempSrc: Oral   SpO2: 97%   Weight: 119.4 kg (263 lb 3.7 oz)   Height: 5' 4" (1.626 m)      Body mass index is 45.18 kg/m².    Physical Exam  Vitals and nursing note reviewed.   Constitutional:       General: He is not in acute distress.     Appearance: He is not ill-appearing.   HENT:      Head: Normocephalic and atraumatic.      Right Ear: External ear normal.      Left Ear: External ear normal.      Nose: Nose normal.      Mouth/Throat:      Mouth: Mucous membranes are moist.   Eyes:      Extraocular Movements: Extraocular movements intact.      Conjunctiva/sclera: Conjunctivae normal.   Cardiovascular:      Rate and Rhythm: Normal rate and regular rhythm.      Pulses: Normal pulses.      Heart sounds: No murmur heard.  Pulmonary:      Effort: Pulmonary effort is normal. No respiratory distress.      Breath sounds: No wheezing.   Abdominal:      General: There is no distension.      Palpations: Abdomen is soft. There is no mass.      Tenderness: There is no abdominal tenderness.   Musculoskeletal:         General: No swelling.      Cervical back: Normal range of motion.   Skin:     Coloration: Skin is not jaundiced.      Findings: No rash.   Neurological:      General: No focal deficit present.      Mental Status: He is alert and oriented to person, place, and time.   Psychiatric:         Mood and Affect: Mood normal.         Thought Content: Thought content normal.       Labs:    Complete Blood Count  Lab Results   Component Value Date    RBC 4.92 08/13/2021    HGB 14.2 08/13/2021    HCT 43.4 08/13/2021    MCV 88 08/13/2021    MCH 28.9 08/13/2021    MCHC 32.7 08/13/2021    RDW 13.3 08/13/2021     08/13/2021    MPV 11.3 08/13/2021    GRAN 2.4 08/13/2021    GRAN 40.3 08/13/2021    LYMPH 2.8 " 08/13/2021    LYMPH 47.6 08/13/2021    MONO 0.6 08/13/2021    MONO 9.8 08/13/2021    EOS 0.1 08/13/2021    BASO 0.05 08/13/2021    EOSINOPHIL 1.2 08/13/2021    BASOPHIL 0.9 08/13/2021    DIFFMETHOD Automated 08/13/2021       Comprehensive Metabolic Panel  No results found for: GLU, BUN, CREATININE, NA, K, CL, PROT, ALBUMIN, BILITOT, AST, ALKPHOS, CO2, ALT, ANIONGAP, EGFRNONAA, ESTGFRAFRICA    TSH  No results found for: TSH    Imaging:  X-ray Knee Ortho Bilateral with Flexion  Narrative: EXAMINATION:  XR KNEE ORTHO BILAT WITH FLEXION    CLINICAL HISTORY:  Pain in right knee    TECHNIQUE:  AP standing of both knees, PA flexion standing views of both knees, and Merchant views of both knees were performed.  Lateral views of both knees were also performed.    COMPARISON:  04/11/2022    FINDINGS:  Right knee: There is a probable small persistent joint effusion present which appears somewhat improved from prior.  No acute fractures or dislocations visualized.  Small marginal osteophytes are present in the patellofemoral and medial compartments with otherwise preserved joint spaces.    Left knee:  There is no radiographic evidence of acute osseous, articular, or soft tissue abnormality. Small tricompartmental marginal osteophytes are present with slight joint space narrowing in the medial compartment.  Impression: As above    Electronically signed by: Randy Mccord MD  Date:    04/26/2022  Time:    11:01      Assessment/Plan:    1. COVID-19    2. Pharyngitis, unspecified etiology  -     POCT Influenza A/B Molecular  -     POCT COVID-19 Rapid Screening    3. Myalgia         Continue supportive care. Quarantine for 5 days.     Fazal Forbes MD    Answers submitted by the patient for this visit:  Review of Systems Questionnaire (Submitted on 12/28/2022)  activity change: No  unexpected weight change: No  rhinorrhea: Yes  trouble swallowing: No  visual disturbance: No  chest tightness: No  polyuria: No  difficulty urinating:  No  joint swelling: No  arthralgias: Yes  confusion: No  dysphoric mood: No

## 2023-03-16 DIAGNOSIS — K21.9 GASTROESOPHAGEAL REFLUX DISEASE, UNSPECIFIED WHETHER ESOPHAGITIS PRESENT: ICD-10-CM

## 2023-03-16 NOTE — TELEPHONE ENCOUNTER
Refill Routing Note   Medication(s) are not appropriate for processing by [facility] for the following reason(s):       Other  Pharmacy comment: Alternative Requested:PRIOR AUTH REQUIRED.    [facility] action(s):  Defer         Appointments  past 12m or future 3m with PCP    Date Provider   Last Visit   12/28/2022 Fazal Forbes MD   Next Visit   Visit date not found Fazal Forbes MD   ED visits in past 90 days: 0        Note composed:2:39 PM 03/16/2023

## 2023-03-16 NOTE — TELEPHONE ENCOUNTER
Care Due:                  Date            Visit Type   Department     Provider  --------------------------------------------------------------------------------                                MYCHART                              FOLLOWUP/OF  [facility]  Last Visit: 12-      [facility] VISIT   MEDICINE       Fazal Forbes  Next Visit: None Scheduled  None         None Found                                                            Last  Test          Frequency    Reason                     Performed    Due Date  --------------------------------------------------------------------------------    CMP.........  12 months..  atorvastatin,              08- 08-                             losartan-hydrochlorothiaz                             michelle......................    Lipid Panel.  12 months..  atorvastatin.............  Not Found    Overdue    [employer] Embedded Care Gaps. Reference number: [id number]. 3/16/2023   10:30:32 AM CDT

## 2023-03-20 DIAGNOSIS — K21.9 GASTROESOPHAGEAL REFLUX DISEASE, UNSPECIFIED WHETHER ESOPHAGITIS PRESENT: ICD-10-CM

## 2023-03-20 RX ORDER — LANSOPRAZOLE 30 MG/1
30 CAPSULE, DELAYED RELEASE ORAL
Qty: 90 CAPSULE | Refills: 3 | Status: SHIPPED | OUTPATIENT
Start: 2023-03-20 | End: 2023-03-21

## 2023-03-20 NOTE — TELEPHONE ENCOUNTER
Refill Routing Note   Medication(s) are not appropriate for processing by [facility] for the following reason(s):       Other    [facility] action(s):  Defer    Medication Therapy Plan: Pharmacy comment: Alternative Requested:NOT COVERED ON INS; defer to you for review    Appointments  past 12m or future 3m with PCP    Date Provider   Last Visit   12/28/2022 Fazal Forebs MD   Next Visit   Visit date not found Fazal Forbes MD   ED visits in past 90 days: 0        Note composed:5:16 PM 03/20/2023

## 2023-03-20 NOTE — TELEPHONE ENCOUNTER
No new care gaps identified.  [facility] Embedded Care Gaps. Reference number: [id number]. 3/20/2023   5:00:25 PM CDT

## 2023-03-21 RX ORDER — PANTOPRAZOLE SODIUM 40 MG/1
40 TABLET, DELAYED RELEASE ORAL
Qty: 90 TABLET | Refills: 3 | Status: SHIPPED | OUTPATIENT
Start: 2023-03-21 | End: 2023-05-28 | Stop reason: SDUPTHER

## 2023-05-28 DIAGNOSIS — K21.9 GASTROESOPHAGEAL REFLUX DISEASE, UNSPECIFIED WHETHER ESOPHAGITIS PRESENT: ICD-10-CM

## 2023-05-28 NOTE — TELEPHONE ENCOUNTER
Care Due:                  Date            Visit Type   Department     Provider  --------------------------------------------------------------------------------                                MYCHART                              FOLLOWUP/OF  [facility]  Last Visit: 12-      [facility] VISIT   MEDICINE       Fazal Forbes  Next Visit: None Scheduled  None         None Found                                                            Last  Test          Frequency    Reason                     Performed    Due Date  --------------------------------------------------------------------------------    CMP.........  12 months..  atorvastatin,              08- 08-                             losartan-hydrochlorothiaz                             michelle......................    Lipid Panel.  12 months..  atorvastatin.............  Not Found    Overdue    [employer] Embedded Care Due Messages. Reference number: [id number].   5/28/2023 4:10:25 PM CDT

## 2023-05-31 RX ORDER — PANTOPRAZOLE SODIUM 40 MG/1
40 TABLET, DELAYED RELEASE ORAL
Qty: 90 TABLET | Refills: 3 | Status: SHIPPED | OUTPATIENT
Start: 2023-05-31 | End: 2023-06-22 | Stop reason: SDUPTHER

## 2023-06-22 ENCOUNTER — OFFICE VISIT (OUTPATIENT)
Dept: FAMILY MEDICINE | Facility: CLINIC | Age: 48
End: 2023-06-22
Payer: COMMERCIAL

## 2023-06-22 VITALS
SYSTOLIC BLOOD PRESSURE: 122 MMHG | WEIGHT: 254.31 LBS | OXYGEN SATURATION: 99 % | DIASTOLIC BLOOD PRESSURE: 88 MMHG | HEART RATE: 82 BPM | BODY MASS INDEX: 43.42 KG/M2 | TEMPERATURE: 98 F | HEIGHT: 64 IN

## 2023-06-22 DIAGNOSIS — E78.5 HYPERLIPIDEMIA, UNSPECIFIED HYPERLIPIDEMIA TYPE: ICD-10-CM

## 2023-06-22 DIAGNOSIS — E78.00 PURE HYPERCHOLESTEROLEMIA: ICD-10-CM

## 2023-06-22 DIAGNOSIS — I10 ESSENTIAL HYPERTENSION: ICD-10-CM

## 2023-06-22 DIAGNOSIS — N40.1 BENIGN PROSTATIC HYPERPLASIA WITH INCOMPLETE BLADDER EMPTYING: ICD-10-CM

## 2023-06-22 DIAGNOSIS — K21.9 GASTROESOPHAGEAL REFLUX DISEASE WITHOUT ESOPHAGITIS: ICD-10-CM

## 2023-06-22 DIAGNOSIS — K21.9 GASTROESOPHAGEAL REFLUX DISEASE, UNSPECIFIED WHETHER ESOPHAGITIS PRESENT: ICD-10-CM

## 2023-06-22 DIAGNOSIS — R73.9 HYPERGLYCEMIA: Primary | ICD-10-CM

## 2023-06-22 DIAGNOSIS — E66.01 MORBID OBESITY: ICD-10-CM

## 2023-06-22 DIAGNOSIS — R39.14 BENIGN PROSTATIC HYPERPLASIA WITH INCOMPLETE BLADDER EMPTYING: ICD-10-CM

## 2023-06-22 PROBLEM — R29.898 DECREASED STRENGTH OF LOWER EXTREMITY: Status: RESOLVED | Noted: 2022-05-04 | Resolved: 2023-06-22

## 2023-06-22 PROCEDURE — 99396 PREV VISIT EST AGE 40-64: CPT | Mod: S$GLB,,, | Performed by: STUDENT IN AN ORGANIZED HEALTH CARE EDUCATION/TRAINING PROGRAM

## 2023-06-22 PROCEDURE — 99396 PR PREVENTIVE VISIT,EST,40-64: ICD-10-PCS | Mod: S$GLB,,, | Performed by: STUDENT IN AN ORGANIZED HEALTH CARE EDUCATION/TRAINING PROGRAM

## 2023-06-22 PROCEDURE — 1159F MED LIST DOCD IN RCRD: CPT | Mod: CPTII,S$GLB,, | Performed by: STUDENT IN AN ORGANIZED HEALTH CARE EDUCATION/TRAINING PROGRAM

## 2023-06-22 PROCEDURE — 3079F DIAST BP 80-89 MM HG: CPT | Mod: CPTII,S$GLB,, | Performed by: STUDENT IN AN ORGANIZED HEALTH CARE EDUCATION/TRAINING PROGRAM

## 2023-06-22 PROCEDURE — 1160F PR REVIEW ALL MEDS BY PRESCRIBER/CLIN PHARMACIST DOCUMENTED: ICD-10-PCS | Mod: CPTII,S$GLB,, | Performed by: STUDENT IN AN ORGANIZED HEALTH CARE EDUCATION/TRAINING PROGRAM

## 2023-06-22 PROCEDURE — 3074F SYST BP LT 130 MM HG: CPT | Mod: CPTII,S$GLB,, | Performed by: STUDENT IN AN ORGANIZED HEALTH CARE EDUCATION/TRAINING PROGRAM

## 2023-06-22 PROCEDURE — 3008F BODY MASS INDEX DOCD: CPT | Mod: CPTII,S$GLB,, | Performed by: STUDENT IN AN ORGANIZED HEALTH CARE EDUCATION/TRAINING PROGRAM

## 2023-06-22 PROCEDURE — 1160F RVW MEDS BY RX/DR IN RCRD: CPT | Mod: CPTII,S$GLB,, | Performed by: STUDENT IN AN ORGANIZED HEALTH CARE EDUCATION/TRAINING PROGRAM

## 2023-06-22 PROCEDURE — 3074F PR MOST RECENT SYSTOLIC BLOOD PRESSURE < 130 MM HG: ICD-10-PCS | Mod: CPTII,S$GLB,, | Performed by: STUDENT IN AN ORGANIZED HEALTH CARE EDUCATION/TRAINING PROGRAM

## 2023-06-22 PROCEDURE — 1159F PR MEDICATION LIST DOCUMENTED IN MEDICAL RECORD: ICD-10-PCS | Mod: CPTII,S$GLB,, | Performed by: STUDENT IN AN ORGANIZED HEALTH CARE EDUCATION/TRAINING PROGRAM

## 2023-06-22 PROCEDURE — 3079F PR MOST RECENT DIASTOLIC BLOOD PRESSURE 80-89 MM HG: ICD-10-PCS | Mod: CPTII,S$GLB,, | Performed by: STUDENT IN AN ORGANIZED HEALTH CARE EDUCATION/TRAINING PROGRAM

## 2023-06-22 PROCEDURE — 3008F PR BODY MASS INDEX (BMI) DOCUMENTED: ICD-10-PCS | Mod: CPTII,S$GLB,, | Performed by: STUDENT IN AN ORGANIZED HEALTH CARE EDUCATION/TRAINING PROGRAM

## 2023-06-22 RX ORDER — TAMSULOSIN HYDROCHLORIDE 0.4 MG/1
0.4 CAPSULE ORAL DAILY
Qty: 90 CAPSULE | Refills: 3 | Status: SHIPPED | OUTPATIENT
Start: 2023-06-22

## 2023-06-22 RX ORDER — LANSOPRAZOLE 30 MG/1
30 CAPSULE, DELAYED RELEASE ORAL DAILY
Qty: 90 CAPSULE | Refills: 3 | Status: SHIPPED | OUTPATIENT
Start: 2023-06-22

## 2023-06-22 RX ORDER — PANTOPRAZOLE SODIUM 40 MG/1
40 TABLET, DELAYED RELEASE ORAL
Qty: 90 TABLET | Refills: 3 | Status: SHIPPED | OUTPATIENT
Start: 2023-06-22 | End: 2023-06-22 | Stop reason: CLARIF

## 2023-06-22 RX ORDER — PANTOPRAZOLE SODIUM 40 MG/1
40 TABLET, DELAYED RELEASE ORAL
Qty: 90 TABLET | Refills: 3 | Status: SHIPPED | OUTPATIENT
Start: 2023-06-22 | End: 2023-06-22 | Stop reason: SDUPTHER

## 2023-06-22 RX ORDER — ATORVASTATIN CALCIUM 10 MG/1
10 TABLET, FILM COATED ORAL DAILY
Qty: 90 TABLET | Refills: 4 | Status: SHIPPED | OUTPATIENT
Start: 2023-06-22

## 2023-06-22 RX ORDER — LOSARTAN POTASSIUM AND HYDROCHLOROTHIAZIDE 25; 100 MG/1; MG/1
1 TABLET ORAL DAILY
Qty: 90 TABLET | Refills: 4 | Status: SHIPPED | OUTPATIENT
Start: 2023-06-22

## 2023-06-22 RX ORDER — LANSOPRAZOLE 30 MG/1
CAPSULE, DELAYED RELEASE ORAL
Refills: 0 | OUTPATIENT
Start: 2023-06-22

## 2023-06-22 NOTE — TELEPHONE ENCOUNTER
No care due was identified.  [facility] Embedded Care Due Messages. Reference number: [id number].   6/22/2023 9:27:28 AM CDT

## 2023-06-22 NOTE — TELEPHONE ENCOUNTER
Refill Routing Note   Medication(s) are not appropriate for processing by [facility] for the following reason(s):      Pharmacy comment: Alternative Requested:NOT COVERED ON INS. PT HAS REACHED QUANTITY LIMITS. NEEDS PRIOR AUTH; DEFER TO YOU FOR REVIEW    [facility] action(s):  Defer None identified   Medication Therapy Plan: PLEASE CONSIDER CHANGING TO ONE OF THE FOLLOWING ALTERNATIVES; PREVACID 30MG, NEXIUM 20MG; PREVACID PENDED BY PHARMACY FOR YOUR CONVIENCE; PLEASE UPDATE SIG UPON COMPLETION      Appointments  past 12m or future 3m with PCP    Date Provider   Last Visit   6/22/2023 Fazal Forbes MD   Next Visit   Visit date not found Fazal Forbes MD   ED visits in past 90 days: 0        Note composed:9:44 AM 06/22/2023

## 2023-06-22 NOTE — PROGRESS NOTES
Patient ID: Manjit Silverio is a 47 y.o. male.     Chief Complaint: Annual Exam    HPI   Patient here for annual exam. He has been out of medications since April. He reports symptoms of GERD, so he was been trying over the counter medications without much relief. He denies recent chest pain and shortness of breath. He was told that he had an enlarged prostate in the past, and he is now getting urinary frequency and feeling like he cannot full empty his bladder.     Review of Systems  Review of Systems   Constitutional:  Negative for fever.   HENT:  Negative for ear pain, hearing loss and sinus pain.    Eyes:  Negative for discharge.   Respiratory:  Negative for cough, shortness of breath and wheezing.    Cardiovascular:  Negative for chest pain, palpitations and leg swelling.   Gastrointestinal:  Negative for blood in stool, constipation, diarrhea, nausea and vomiting.   Genitourinary:  Positive for urgency. Negative for hematuria.   Musculoskeletal:  Negative for myalgias and neck pain.   Skin:  Negative for rash.   Neurological:  Negative for weakness and headaches.   Endo/Heme/Allergies:  Negative for polydipsia.   Psychiatric/Behavioral:  Negative for depression.    All other systems reviewed and are negative.    Currently Medications  Current Outpatient Medications on File Prior to Visit   Medication Sig Dispense Refill    [DISCONTINUED] atorvastatin (LIPITOR) 10 MG tablet TAKE 1 TABLET BY MOUTH EVERY DAY (Patient not taking: Reported on 6/22/2023) 90 tablet 4    [DISCONTINUED] losartan-hydrochlorothiazide 100-25 mg (HYZAAR) 100-25 mg per tablet TAKE 1 TABLET BY MOUTH EVERY DAY (Patient not taking: Reported on 6/22/2023) 90 tablet 4    [DISCONTINUED] pantoprazole (PROTONIX) 40 MG tablet Take 1 tablet (40 mg total) by mouth before breakfast. (Patient not taking: Reported on 6/22/2023) 90 tablet 3     No current facility-administered medications on file prior to visit.       Physical  Exam  Vitals:    06/22/23  "0848   BP: 122/88   BP Location: Right arm   Patient Position: Sitting   Pulse: 82   Temp: 97.8 °F (36.6 °C)   TempSrc: Oral   SpO2: 99%   Weight: 115.4 kg (254 lb 4.8 oz)   Height: 5' 4" (1.626 m)      Body mass index is 43.65 kg/m².  Wt Readings from Last 3 Encounters:   06/22/23 115.4 kg (254 lb 4.8 oz)   12/28/22 119.4 kg (263 lb 3.7 oz)   06/27/22 114.7 kg (252 lb 13.9 oz)       Physical Exam  Vitals and nursing note reviewed.   Constitutional:       General: He is not in acute distress.     Appearance: He is not ill-appearing.   HENT:      Head: Normocephalic and atraumatic.      Right Ear: External ear normal.      Left Ear: External ear normal.      Nose: Nose normal.      Mouth/Throat:      Mouth: Mucous membranes are moist.   Eyes:      Extraocular Movements: Extraocular movements intact.      Conjunctiva/sclera: Conjunctivae normal.   Cardiovascular:      Rate and Rhythm: Normal rate and regular rhythm.      Pulses: Normal pulses.      Heart sounds: No murmur heard.  Pulmonary:      Effort: Pulmonary effort is normal. No respiratory distress.      Breath sounds: No wheezing.   Abdominal:      General: There is no distension.      Palpations: Abdomen is soft. There is no mass.      Tenderness: There is no abdominal tenderness.   Musculoskeletal:         General: No swelling.      Cervical back: Normal range of motion.   Skin:     Coloration: Skin is not jaundiced.      Findings: No rash.   Neurological:      General: No focal deficit present.      Mental Status: He is alert and oriented to person, place, and time.   Psychiatric:         Mood and Affect: Mood normal.         Thought Content: Thought content normal.       Labs:    Complete Blood Count  Lab Results   Component Value Date    RBC 4.92 08/13/2021    HGB 14.2 08/13/2021    HCT 43.4 08/13/2021    MCV 88 08/13/2021    MCH 28.9 08/13/2021    MCHC 32.7 08/13/2021    RDW 13.3 08/13/2021     08/13/2021    MPV 11.3 08/13/2021    GRAN 2.4 " 08/13/2021    GRAN 40.3 08/13/2021    LYMPH 2.8 08/13/2021    LYMPH 47.6 08/13/2021    MONO 0.6 08/13/2021    MONO 9.8 08/13/2021    EOS 0.1 08/13/2021    BASO 0.05 08/13/2021    EOSINOPHIL 1.2 08/13/2021    BASOPHIL 0.9 08/13/2021    DIFFMETHOD Automated 08/13/2021       Comprehensive Metabolic Panel  No results found for: GLU, BUN, CREATININE, NA, K, CL, PROT, ALBUMIN, BILITOT, AST, ALKPHOS, CO2, ALT, ANIONGAP, EGFRNONAA, ESTGFRAFRICA    TSH  No results found for: TSH    Imaging:  X-ray Knee Ortho Bilateral with Flexion  Narrative: EXAMINATION:  XR KNEE ORTHO BILAT WITH FLEXION    CLINICAL HISTORY:  Pain in right knee    TECHNIQUE:  AP standing of both knees, PA flexion standing views of both knees, and Merchant views of both knees were performed.  Lateral views of both knees were also performed.    COMPARISON:  04/11/2022    FINDINGS:  Right knee: There is a probable small persistent joint effusion present which appears somewhat improved from prior.  No acute fractures or dislocations visualized.  Small marginal osteophytes are present in the patellofemoral and medial compartments with otherwise preserved joint spaces.    Left knee:  There is no radiographic evidence of acute osseous, articular, or soft tissue abnormality. Small tricompartmental marginal osteophytes are present with slight joint space narrowing in the medial compartment.  Impression: As above    Electronically signed by: Randy Mccord MD  Date:    04/26/2022  Time:    11:01      Assessment/Plan:    1. Hyperglycemia  -     HEMOGLOBIN A1C; Future; Expected date: 06/22/2023    2. Gastroesophageal reflux disease, unspecified whether esophagitis present    3. Hyperlipidemia, unspecified hyperlipidemia type  -     atorvastatin (LIPITOR) 10 MG tablet; Take 1 tablet (10 mg total) by mouth once daily.  Dispense: 90 tablet; Refill: 4  -     LIPID PANEL; Future; Expected date: 06/22/2023    4. Pure hypercholesterolemia  -     atorvastatin (LIPITOR) 10 MG  tablet; Take 1 tablet (10 mg total) by mouth once daily.  Dispense: 90 tablet; Refill: 4    5. Essential hypertension  -     losartan-hydrochlorothiazide 100-25 mg (HYZAAR) 100-25 mg per tablet; Take 1 tablet by mouth once daily.  Dispense: 90 tablet; Refill: 4  -     CBC Auto Differential; Future  -     Comprehensive metabolic panel; Future; Expected date: 06/22/2023  -     TSH; Future; Expected date: 06/22/2023    6. Benign prostatic hyperplasia with incomplete bladder emptying  -     PROSTATE SPECIFIC ANTIGEN, DIAGNOSTIC; Future; Expected date: 06/22/2023    7. Morbid obesity  Assessment & Plan:  Body mass index is 43.65 kg/m².  - discussed diet and exercise      8. Gastroesophageal reflux disease without esophagitis  -     pantoprazole (PROTONIX) 40 MG tablet; Take 1 tablet (40 mg total) by mouth before breakfast.  Dispense: 90 tablet; Refill: 3    Other orders  -     Discontinue: pantoprazole (PROTONIX) 40 MG tablet; Take 1 tablet (40 mg total) by mouth before breakfast.  Dispense: 90 tablet; Refill: 3  -     tamsulosin (FLOMAX) 0.4 mg Cap; Take 1 capsule (0.4 mg total) by mouth once daily.  Dispense: 90 capsule; Refill: 3         Discussed how to stay healthy including: diet, exercise, refraining from smoking and discussed screening exams / tests needed for age, sex and family Hx.    RTC pending labs    Fazal Forbes MD    Answers submitted by the patient for this visit:  Review of Systems Questionnaire (Submitted on 6/16/2023)  activity change: No  unexpected weight change: No  rhinorrhea: No  trouble swallowing: No  visual disturbance: Yes  chest tightness: No  polyuria: No  difficulty urinating: Yes  joint swelling: No  arthralgias: No  confusion: No  dysphoric mood: No

## 2023-06-23 ENCOUNTER — LAB VISIT (OUTPATIENT)
Dept: LAB | Facility: HOSPITAL | Age: 48
End: 2023-06-23
Attending: STUDENT IN AN ORGANIZED HEALTH CARE EDUCATION/TRAINING PROGRAM
Payer: COMMERCIAL

## 2023-06-23 DIAGNOSIS — I10 ESSENTIAL HYPERTENSION: ICD-10-CM

## 2023-06-23 DIAGNOSIS — R73.9 HYPERGLYCEMIA: ICD-10-CM

## 2023-06-23 DIAGNOSIS — E78.5 HYPERLIPIDEMIA, UNSPECIFIED HYPERLIPIDEMIA TYPE: ICD-10-CM

## 2023-06-23 DIAGNOSIS — R39.14 BENIGN PROSTATIC HYPERPLASIA WITH INCOMPLETE BLADDER EMPTYING: ICD-10-CM

## 2023-06-23 DIAGNOSIS — N40.1 BENIGN PROSTATIC HYPERPLASIA WITH INCOMPLETE BLADDER EMPTYING: ICD-10-CM

## 2023-06-23 LAB
ALBUMIN SERPL BCP-MCNC: 4.2 G/DL (ref 3.5–5.2)
ALP SERPL-CCNC: 64 U/L (ref 38–126)
ALT SERPL W/O P-5'-P-CCNC: 50 U/L (ref 10–44)
ANION GAP SERPL CALC-SCNC: 10 MMOL/L (ref 8–16)
AST SERPL-CCNC: 29 U/L (ref 15–46)
BASOPHILS # BLD AUTO: 0.04 K/UL (ref 0–0.2)
BASOPHILS NFR BLD: 0.6 % (ref 0–1.9)
BILIRUB SERPL-MCNC: 0.7 MG/DL (ref 0.1–1)
CALCIUM SERPL-MCNC: 8.9 MG/DL (ref 8.7–10.5)
CHLORIDE SERPL-SCNC: 104 MMOL/L (ref 95–110)
CHOLEST SERPL-MCNC: 173 MG/DL (ref 120–199)
CHOLEST/HDLC SERPL: 5.2 {RATIO} (ref 2–5)
CO2 SERPL-SCNC: 29 MMOL/L (ref 23–29)
COMPLEXED PSA SERPL-MCNC: 2.7 NG/ML (ref 0–4)
CREAT SERPL-MCNC: 1.18 MG/DL (ref 0.5–1.4)
DIFFERENTIAL METHOD: NORMAL
EOSINOPHIL # BLD AUTO: 0.1 K/UL (ref 0–0.5)
EOSINOPHIL NFR BLD: 1.4 % (ref 0–8)
ERYTHROCYTE [DISTWIDTH] IN BLOOD BY AUTOMATED COUNT: 12.8 % (ref 11.5–14.5)
EST. GFR  (NO RACE VARIABLE): >60 ML/MIN/1.73 M^2
ESTIMATED AVG GLUCOSE: 105 MG/DL (ref 68–131)
GLUCOSE SERPL-MCNC: 98 MG/DL (ref 70–110)
HBA1C MFR BLD: 5.3 % (ref 4–5.6)
HCT VFR BLD AUTO: 44.5 % (ref 40–54)
HDLC SERPL-MCNC: 33 MG/DL (ref 40–75)
HDLC SERPL: 19.1 % (ref 20–50)
HGB BLD-MCNC: 14.7 G/DL (ref 14–18)
IMM GRANULOCYTES # BLD AUTO: 0.01 K/UL (ref 0–0.04)
IMM GRANULOCYTES NFR BLD AUTO: 0.2 % (ref 0–0.5)
LDLC SERPL CALC-MCNC: 116 MG/DL (ref 63–159)
LYMPHOCYTES # BLD AUTO: 2.5 K/UL (ref 1–4.8)
LYMPHOCYTES NFR BLD: 39.3 % (ref 18–48)
MCH RBC QN AUTO: 29.2 PG (ref 27–31)
MCHC RBC AUTO-ENTMCNC: 33 G/DL (ref 32–36)
MCV RBC AUTO: 88 FL (ref 82–98)
MONOCYTES # BLD AUTO: 0.6 K/UL (ref 0.3–1)
MONOCYTES NFR BLD: 9.3 % (ref 4–15)
NEUTROPHILS # BLD AUTO: 3.2 K/UL (ref 1.8–7.7)
NEUTROPHILS NFR BLD: 49.2 % (ref 38–73)
NONHDLC SERPL-MCNC: 140 MG/DL
NRBC BLD-RTO: 0 /100 WBC
PLATELET # BLD AUTO: 223 K/UL (ref 150–450)
PMV BLD AUTO: 10.7 FL (ref 9.2–12.9)
POTASSIUM SERPL-SCNC: 3.6 MMOL/L (ref 3.5–5.1)
PROT SERPL-MCNC: 7 G/DL (ref 6–8.4)
RBC # BLD AUTO: 5.04 M/UL (ref 4.6–6.2)
SODIUM SERPL-SCNC: 143 MMOL/L (ref 136–145)
TRIGL SERPL-MCNC: 120 MG/DL (ref 30–150)
TSH SERPL DL<=0.005 MIU/L-ACNC: 1.22 UIU/ML (ref 0.4–4)
UUN UR-MCNC: 10 MG/DL (ref 2–20)
WBC # BLD AUTO: 6.47 K/UL (ref 3.9–12.7)

## 2023-06-23 PROCEDURE — 80053 COMPREHEN METABOLIC PANEL: CPT | Mod: PO | Performed by: STUDENT IN AN ORGANIZED HEALTH CARE EDUCATION/TRAINING PROGRAM

## 2023-06-23 PROCEDURE — 85025 COMPLETE CBC W/AUTO DIFF WBC: CPT | Mod: PO | Performed by: STUDENT IN AN ORGANIZED HEALTH CARE EDUCATION/TRAINING PROGRAM

## 2023-06-23 PROCEDURE — 83036 HEMOGLOBIN GLYCOSYLATED A1C: CPT | Performed by: STUDENT IN AN ORGANIZED HEALTH CARE EDUCATION/TRAINING PROGRAM

## 2023-06-23 PROCEDURE — 36415 COLL VENOUS BLD VENIPUNCTURE: CPT | Mod: PO | Performed by: STUDENT IN AN ORGANIZED HEALTH CARE EDUCATION/TRAINING PROGRAM

## 2023-06-23 PROCEDURE — 84153 ASSAY OF PSA TOTAL: CPT | Performed by: STUDENT IN AN ORGANIZED HEALTH CARE EDUCATION/TRAINING PROGRAM

## 2023-06-23 PROCEDURE — 80061 LIPID PANEL: CPT | Performed by: STUDENT IN AN ORGANIZED HEALTH CARE EDUCATION/TRAINING PROGRAM

## 2023-06-23 PROCEDURE — 84443 ASSAY THYROID STIM HORMONE: CPT | Mod: PO | Performed by: STUDENT IN AN ORGANIZED HEALTH CARE EDUCATION/TRAINING PROGRAM

## 2023-11-07 ENCOUNTER — OFFICE VISIT (OUTPATIENT)
Dept: FAMILY MEDICINE | Facility: CLINIC | Age: 48
End: 2023-11-07
Payer: COMMERCIAL

## 2023-11-07 VITALS
BODY MASS INDEX: 40.26 KG/M2 | HEIGHT: 64 IN | TEMPERATURE: 98 F | HEART RATE: 79 BPM | WEIGHT: 235.81 LBS | SYSTOLIC BLOOD PRESSURE: 130 MMHG | DIASTOLIC BLOOD PRESSURE: 82 MMHG | OXYGEN SATURATION: 98 %

## 2023-11-07 DIAGNOSIS — I10 ESSENTIAL HYPERTENSION: Primary | ICD-10-CM

## 2023-11-07 DIAGNOSIS — R39.14 BENIGN PROSTATIC HYPERPLASIA WITH INCOMPLETE BLADDER EMPTYING: ICD-10-CM

## 2023-11-07 DIAGNOSIS — N40.1 BENIGN PROSTATIC HYPERPLASIA WITH INCOMPLETE BLADDER EMPTYING: ICD-10-CM

## 2023-11-07 DIAGNOSIS — E78.00 PURE HYPERCHOLESTEROLEMIA: ICD-10-CM

## 2023-11-07 DIAGNOSIS — Z23 NEED FOR INFLUENZA VACCINATION: ICD-10-CM

## 2023-11-07 PROCEDURE — 99214 PR OFFICE/OUTPT VISIT, EST, LEVL IV, 30-39 MIN: ICD-10-PCS | Mod: 25,S$GLB,, | Performed by: STUDENT IN AN ORGANIZED HEALTH CARE EDUCATION/TRAINING PROGRAM

## 2023-11-07 PROCEDURE — 99214 OFFICE O/P EST MOD 30 MIN: CPT | Mod: 25,S$GLB,, | Performed by: STUDENT IN AN ORGANIZED HEALTH CARE EDUCATION/TRAINING PROGRAM

## 2023-11-07 PROCEDURE — 1159F PR MEDICATION LIST DOCUMENTED IN MEDICAL RECORD: ICD-10-PCS | Mod: CPTII,S$GLB,, | Performed by: STUDENT IN AN ORGANIZED HEALTH CARE EDUCATION/TRAINING PROGRAM

## 2023-11-07 PROCEDURE — 3079F DIAST BP 80-89 MM HG: CPT | Mod: CPTII,S$GLB,, | Performed by: STUDENT IN AN ORGANIZED HEALTH CARE EDUCATION/TRAINING PROGRAM

## 2023-11-07 PROCEDURE — 1160F RVW MEDS BY RX/DR IN RCRD: CPT | Mod: CPTII,S$GLB,, | Performed by: STUDENT IN AN ORGANIZED HEALTH CARE EDUCATION/TRAINING PROGRAM

## 2023-11-07 PROCEDURE — 1159F MED LIST DOCD IN RCRD: CPT | Mod: CPTII,S$GLB,, | Performed by: STUDENT IN AN ORGANIZED HEALTH CARE EDUCATION/TRAINING PROGRAM

## 2023-11-07 PROCEDURE — 3079F PR MOST RECENT DIASTOLIC BLOOD PRESSURE 80-89 MM HG: ICD-10-PCS | Mod: CPTII,S$GLB,, | Performed by: STUDENT IN AN ORGANIZED HEALTH CARE EDUCATION/TRAINING PROGRAM

## 2023-11-07 PROCEDURE — 3044F HG A1C LEVEL LT 7.0%: CPT | Mod: CPTII,S$GLB,, | Performed by: STUDENT IN AN ORGANIZED HEALTH CARE EDUCATION/TRAINING PROGRAM

## 2023-11-07 PROCEDURE — 3075F SYST BP GE 130 - 139MM HG: CPT | Mod: CPTII,S$GLB,, | Performed by: STUDENT IN AN ORGANIZED HEALTH CARE EDUCATION/TRAINING PROGRAM

## 2023-11-07 PROCEDURE — 90686 FLU VACCINE (QUAD) GREATER THAN OR EQUAL TO 3YO PRESERVATIVE FREE IM: ICD-10-PCS | Mod: S$GLB,,, | Performed by: STUDENT IN AN ORGANIZED HEALTH CARE EDUCATION/TRAINING PROGRAM

## 2023-11-07 PROCEDURE — 3044F PR MOST RECENT HEMOGLOBIN A1C LEVEL <7.0%: ICD-10-PCS | Mod: CPTII,S$GLB,, | Performed by: STUDENT IN AN ORGANIZED HEALTH CARE EDUCATION/TRAINING PROGRAM

## 2023-11-07 PROCEDURE — 3008F PR BODY MASS INDEX (BMI) DOCUMENTED: ICD-10-PCS | Mod: CPTII,S$GLB,, | Performed by: STUDENT IN AN ORGANIZED HEALTH CARE EDUCATION/TRAINING PROGRAM

## 2023-11-07 PROCEDURE — 3008F BODY MASS INDEX DOCD: CPT | Mod: CPTII,S$GLB,, | Performed by: STUDENT IN AN ORGANIZED HEALTH CARE EDUCATION/TRAINING PROGRAM

## 2023-11-07 PROCEDURE — 90686 IIV4 VACC NO PRSV 0.5 ML IM: CPT | Mod: S$GLB,,, | Performed by: STUDENT IN AN ORGANIZED HEALTH CARE EDUCATION/TRAINING PROGRAM

## 2023-11-07 PROCEDURE — 90471 IMMUNIZATION ADMIN: CPT | Mod: S$GLB,,, | Performed by: STUDENT IN AN ORGANIZED HEALTH CARE EDUCATION/TRAINING PROGRAM

## 2023-11-07 PROCEDURE — 1160F PR REVIEW ALL MEDS BY PRESCRIBER/CLIN PHARMACIST DOCUMENTED: ICD-10-PCS | Mod: CPTII,S$GLB,, | Performed by: STUDENT IN AN ORGANIZED HEALTH CARE EDUCATION/TRAINING PROGRAM

## 2023-11-07 PROCEDURE — 3075F PR MOST RECENT SYSTOLIC BLOOD PRESS GE 130-139MM HG: ICD-10-PCS | Mod: CPTII,S$GLB,, | Performed by: STUDENT IN AN ORGANIZED HEALTH CARE EDUCATION/TRAINING PROGRAM

## 2023-11-07 PROCEDURE — 90471 FLU VACCINE (QUAD) GREATER THAN OR EQUAL TO 3YO PRESERVATIVE FREE IM: ICD-10-PCS | Mod: S$GLB,,, | Performed by: STUDENT IN AN ORGANIZED HEALTH CARE EDUCATION/TRAINING PROGRAM

## 2023-11-07 RX ORDER — PANTOPRAZOLE SODIUM 40 MG/1
40 TABLET, DELAYED RELEASE ORAL EVERY MORNING
COMMUNITY
Start: 2023-09-18 | End: 2023-11-07 | Stop reason: ALTCHOICE

## 2023-11-07 NOTE — PROGRESS NOTES
Patient ID: Manjit Silverio is a 48 y.o. male.     Chief Complaint: f/u chronic medical conditions    Cough  This is a recurrent problem. The current episode started in the past 7 days. The problem has been gradually improving. The problem occurs every few hours. The cough is Non-productive. Associated symptoms include heartburn and postnasal drip. Pertinent negatives include no chest pain, chills, ear congestion, ear pain, fever, headaches, hemoptysis, myalgias, rash, rhinorrhea, sore throat, shortness of breath, sweats, weight loss or wheezing. Nothing aggravates the symptoms. He has tried nothing and OTC cough suppressant for the symptoms. The treatment provided moderate relief. There is no history of asthma, bronchiectasis, bronchitis, COPD or emphysema.      Patient states he had an upper respiratory infection last week but symptoms have completely resolved.     HTN- blood pressure has been at goal    HLD- denies recent chest pain and shortness of breath.     GERD- symptoms well controlled    Review of Systems  Review of Systems   Constitutional:  Negative for chills, fever and weight loss.   HENT:  Positive for postnasal drip. Negative for ear pain, rhinorrhea, sinus pain and sore throat.    Eyes:  Negative for discharge.   Respiratory:  Positive for cough. Negative for hemoptysis, shortness of breath and wheezing.    Cardiovascular:  Negative for chest pain and leg swelling.   Gastrointestinal:  Positive for heartburn. Negative for diarrhea, nausea and vomiting.   Genitourinary:  Negative for urgency.   Musculoskeletal:  Negative for myalgias.   Skin:  Negative for rash.   Neurological:  Negative for weakness and headaches.   Psychiatric/Behavioral:  Negative for depression.    All other systems reviewed and are negative.      Currently Medications  Current Outpatient Medications on File Prior to Visit   Medication Sig Dispense Refill    atorvastatin (LIPITOR) 10 MG tablet Take 1 tablet (10 mg total) by  "mouth once daily. 90 tablet 4    lansoprazole (PREVACID) 30 MG capsule Take 1 capsule (30 mg total) by mouth once daily. 90 capsule 3    losartan-hydrochlorothiazide 100-25 mg (HYZAAR) 100-25 mg per tablet Take 1 tablet by mouth once daily. 90 tablet 4    tamsulosin (FLOMAX) 0.4 mg Cap Take 1 capsule (0.4 mg total) by mouth once daily. 90 capsule 3    [DISCONTINUED] pantoprazole (PROTONIX) 40 MG tablet Take 40 mg by mouth every morning.       No current facility-administered medications on file prior to visit.       Physical  Exam  Vitals:    11/07/23 0823   BP: 130/82   BP Location: Right arm   Patient Position: Sitting   Pulse: 79   Temp: 98.2 °F (36.8 °C)   SpO2: 98%   Weight: 106.9 kg (235 lb 12.5 oz)   Height: 5' 4" (1.626 m)      Body mass index is 40.47 kg/m².  Wt Readings from Last 3 Encounters:   11/07/23 106.9 kg (235 lb 12.5 oz)   06/22/23 115.4 kg (254 lb 4.8 oz)   12/28/22 119.4 kg (263 lb 3.7 oz)       Physical Exam  Vitals and nursing note reviewed.   Constitutional:       General: He is not in acute distress.     Appearance: He is not ill-appearing.   HENT:      Head: Normocephalic and atraumatic.      Right Ear: External ear normal.      Left Ear: External ear normal.      Nose: Nose normal.      Mouth/Throat:      Mouth: Mucous membranes are moist.   Eyes:      Extraocular Movements: Extraocular movements intact.      Conjunctiva/sclera: Conjunctivae normal.   Cardiovascular:      Rate and Rhythm: Normal rate and regular rhythm.      Pulses: Normal pulses.      Heart sounds: No murmur heard.  Pulmonary:      Effort: Pulmonary effort is normal. No respiratory distress.      Breath sounds: No wheezing.   Abdominal:      General: There is no distension.      Palpations: Abdomen is soft. There is no mass.      Tenderness: There is no abdominal tenderness.   Musculoskeletal:         General: No swelling.      Cervical back: Normal range of motion.   Skin:     Coloration: Skin is not jaundiced.      " Findings: No rash.   Neurological:      General: No focal deficit present.      Mental Status: He is alert and oriented to person, place, and time.   Psychiatric:         Mood and Affect: Mood normal.         Thought Content: Thought content normal.         Labs:    Complete Blood Count  Lab Results   Component Value Date    RBC 5.04 06/23/2023    HGB 14.7 06/23/2023    HCT 44.5 06/23/2023    MCV 88 06/23/2023    MCH 29.2 06/23/2023    MCHC 33.0 06/23/2023    RDW 12.8 06/23/2023     06/23/2023    MPV 10.7 06/23/2023    GRAN 3.2 06/23/2023    GRAN 49.2 06/23/2023    LYMPH 2.5 06/23/2023    LYMPH 39.3 06/23/2023    MONO 0.6 06/23/2023    MONO 9.3 06/23/2023    EOS 0.1 06/23/2023    BASO 0.04 06/23/2023    EOSINOPHIL 1.4 06/23/2023    BASOPHIL 0.6 06/23/2023    DIFFMETHOD Automated 06/23/2023       Comprehensive Metabolic Panel  Lab Results   Component Value Date    GLU 98 06/23/2023    BUN 10 06/23/2023    CREATININE 1.18 06/23/2023     06/23/2023    K 3.6 06/23/2023     06/23/2023    PROT 7.0 06/23/2023    ALBUMIN 4.2 06/23/2023    BILITOT 0.7 06/23/2023    AST 29 06/23/2023    ALKPHOS 64 06/23/2023    CO2 29 06/23/2023    ALT 50 (H) 06/23/2023    ANIONGAP 10 06/23/2023       TSH  Lab Results   Component Value Date    TSH 1.220 06/23/2023       Imaging:  X-ray Knee Ortho Bilateral with Flexion  Narrative: EXAMINATION:  XR KNEE ORTHO BILAT WITH FLEXION    CLINICAL HISTORY:  Pain in right knee    TECHNIQUE:  AP standing of both knees, PA flexion standing views of both knees, and Merchant views of both knees were performed.  Lateral views of both knees were also performed.    COMPARISON:  04/11/2022    FINDINGS:  Right knee: There is a probable small persistent joint effusion present which appears somewhat improved from prior.  No acute fractures or dislocations visualized.  Small marginal osteophytes are present in the patellofemoral and medial compartments with otherwise preserved joint  spaces.    Left knee:  There is no radiographic evidence of acute osseous, articular, or soft tissue abnormality. Small tricompartmental marginal osteophytes are present with slight joint space narrowing in the medial compartment.  Impression: As above    Electronically signed by: Randy Mccord MD  Date:    04/26/2022  Time:    11:01      Assessment/Plan:    1. Essential hypertension    2. Need for influenza vaccination  -     Influenza - Quadrivalent *Preferred* (6 months+) (PF)    3. Pure hypercholesterolemia    4. Benign prostatic hyperplasia with incomplete bladder emptying         Discussed how to stay healthy including: diet, exercise, refraining from smoking and discussed screening exams / tests needed for age, sex and family Hx.      Fazal Forbes MD      Answers submitted by the patient for this visit:  Cough Questionnaire (Submitted on 11/6/2023)  Chief Complaint: Cough  Chronicity: recurrent  Onset: in the past 7 days  Progression since onset: gradually improving  Frequency: every few hours  Cough characteristics: non-productive  chest pain: No  chills: No  ear congestion: No  ear pain: No  fever: No  headaches: No  heartburn: Yes  hemoptysis: No  myalgias: No  postnasal drip: Yes  rash: No  rhinorrhea: No  shortness of breath: No  sore throat: No  sweats: No  weight loss: No  wheezing: No  Aggravated by: nothing  asthma: No  bronchiectasis: No  bronchitis: No  COPD: No  emphysema: No  Treatments tried: nothing, OTC cough suppressant  Improvement on treatment: moderate

## 2024-05-23 ENCOUNTER — OFFICE VISIT (OUTPATIENT)
Dept: FAMILY MEDICINE | Facility: CLINIC | Age: 49
End: 2024-05-23
Payer: COMMERCIAL

## 2024-05-23 VITALS
TEMPERATURE: 98 F | HEIGHT: 64 IN | HEART RATE: 89 BPM | WEIGHT: 239.63 LBS | OXYGEN SATURATION: 95 % | BODY MASS INDEX: 40.91 KG/M2 | SYSTOLIC BLOOD PRESSURE: 122 MMHG | DIASTOLIC BLOOD PRESSURE: 82 MMHG

## 2024-05-23 DIAGNOSIS — Z12.5 PROSTATE CANCER SCREENING: ICD-10-CM

## 2024-05-23 DIAGNOSIS — M79.651 RIGHT THIGH PAIN: ICD-10-CM

## 2024-05-23 DIAGNOSIS — R39.14 BENIGN PROSTATIC HYPERPLASIA WITH INCOMPLETE BLADDER EMPTYING: ICD-10-CM

## 2024-05-23 DIAGNOSIS — E78.00 PURE HYPERCHOLESTEROLEMIA: ICD-10-CM

## 2024-05-23 DIAGNOSIS — E66.01 MORBID OBESITY: ICD-10-CM

## 2024-05-23 DIAGNOSIS — R73.9 HYPERGLYCEMIA: ICD-10-CM

## 2024-05-23 DIAGNOSIS — M25.511 CHRONIC RIGHT SHOULDER PAIN: ICD-10-CM

## 2024-05-23 DIAGNOSIS — I10 ESSENTIAL HYPERTENSION: ICD-10-CM

## 2024-05-23 DIAGNOSIS — Z00.00 WELLNESS EXAMINATION: Primary | ICD-10-CM

## 2024-05-23 DIAGNOSIS — G89.29 CHRONIC RIGHT SHOULDER PAIN: ICD-10-CM

## 2024-05-23 DIAGNOSIS — N40.1 BENIGN PROSTATIC HYPERPLASIA WITH INCOMPLETE BLADDER EMPTYING: ICD-10-CM

## 2024-05-23 PROCEDURE — 3008F BODY MASS INDEX DOCD: CPT | Mod: CPTII,S$GLB,, | Performed by: STUDENT IN AN ORGANIZED HEALTH CARE EDUCATION/TRAINING PROGRAM

## 2024-05-23 PROCEDURE — 3074F SYST BP LT 130 MM HG: CPT | Mod: CPTII,S$GLB,, | Performed by: STUDENT IN AN ORGANIZED HEALTH CARE EDUCATION/TRAINING PROGRAM

## 2024-05-23 PROCEDURE — 1159F MED LIST DOCD IN RCRD: CPT | Mod: CPTII,S$GLB,, | Performed by: STUDENT IN AN ORGANIZED HEALTH CARE EDUCATION/TRAINING PROGRAM

## 2024-05-23 PROCEDURE — 3079F DIAST BP 80-89 MM HG: CPT | Mod: CPTII,S$GLB,, | Performed by: STUDENT IN AN ORGANIZED HEALTH CARE EDUCATION/TRAINING PROGRAM

## 2024-05-23 PROCEDURE — 1160F RVW MEDS BY RX/DR IN RCRD: CPT | Mod: CPTII,S$GLB,, | Performed by: STUDENT IN AN ORGANIZED HEALTH CARE EDUCATION/TRAINING PROGRAM

## 2024-05-23 PROCEDURE — 99396 PREV VISIT EST AGE 40-64: CPT | Mod: S$GLB,,, | Performed by: STUDENT IN AN ORGANIZED HEALTH CARE EDUCATION/TRAINING PROGRAM

## 2024-05-23 RX ORDER — METHYLPREDNISOLONE 4 MG/1
TABLET ORAL
Qty: 21 EACH | Refills: 0 | Status: SHIPPED | OUTPATIENT
Start: 2024-05-23 | End: 2024-06-13

## 2024-05-23 NOTE — PROGRESS NOTES
Patient ID: Manjit Silverio is a 48 y.o. male.     Chief Complaint: wellness    Shoulder Pain   The pain is present in the right shoulder. This is a chronic problem. The current episode started more than 1 month ago. There has been no history of extremity trauma. The problem occurs daily. The problem has been waxing and waning. The quality of the pain is described as aching. The pain is at a severity of 5/10. The pain is moderate. Associated symptoms include stiffness. Pertinent negatives include no fever, headaches, inability to bear weight, joint locking, joint swelling or limited range of motion. The symptoms are aggravated by activity (palpation). He has tried acetaminophen for the symptoms. The treatment provided no relief. Family history does not include arthritis. There is no history of Injuries to Extremity.      He also reports right thigh pain with OLE movement to put on socks and shoes.     Review of Systems  Review of Systems   Constitutional:  Negative for fever.   HENT:  Negative for ear pain and sinus pain.    Eyes:  Negative for discharge.   Respiratory:  Negative for cough and shortness of breath.    Cardiovascular:  Negative for chest pain and leg swelling.   Gastrointestinal:  Negative for diarrhea, nausea and vomiting.   Genitourinary:  Negative for urgency.   Musculoskeletal:  Positive for stiffness. Negative for myalgias.   Skin:  Negative for rash.   Neurological:  Negative for weakness and headaches.   Psychiatric/Behavioral:  Negative for depression.    All other systems reviewed and are negative.      Currently Medications  Current Outpatient Medications on File Prior to Visit   Medication Sig Dispense Refill    tamsulosin (FLOMAX) 0.4 mg Cap Take 1 capsule (0.4 mg total) by mouth once daily. 90 capsule 3    [DISCONTINUED] lansoprazole (PREVACID) 30 MG capsule Take 1 capsule (30 mg total) by mouth once daily. 90 capsule 3    [DISCONTINUED] losartan-hydrochlorothiazide 100-25 mg  "(HYZAAR) 100-25 mg per tablet Take 1 tablet by mouth once daily. 90 tablet 4    atorvastatin (LIPITOR) 10 MG tablet Take 1 tablet (10 mg total) by mouth once daily. (Patient not taking: Reported on 5/23/2024) 90 tablet 4     No current facility-administered medications on file prior to visit.       Physical  Exam  Vitals:    05/23/24 0841   BP: 122/82   BP Location: Right arm   Patient Position: Sitting   Pulse: 89   Temp: 97.9 °F (36.6 °C)   SpO2: 95%   Weight: 108.7 kg (239 lb 10.2 oz)   Height: 5' 4" (1.626 m)      Body mass index is 41.13 kg/m².  Wt Readings from Last 3 Encounters:   05/23/24 108.7 kg (239 lb 10.2 oz)   11/07/23 106.9 kg (235 lb 12.5 oz)   06/22/23 115.4 kg (254 lb 4.8 oz)       Physical Exam  Vitals and nursing note reviewed.   Constitutional:       General: He is not in acute distress.     Appearance: He is not ill-appearing.   HENT:      Head: Normocephalic and atraumatic.      Right Ear: External ear normal.      Left Ear: External ear normal.      Nose: Nose normal.      Mouth/Throat:      Mouth: Mucous membranes are moist.   Eyes:      Extraocular Movements: Extraocular movements intact.      Conjunctiva/sclera: Conjunctivae normal.   Cardiovascular:      Rate and Rhythm: Normal rate and regular rhythm.      Pulses: Normal pulses.      Heart sounds: No murmur heard.  Pulmonary:      Effort: Pulmonary effort is normal. No respiratory distress.      Breath sounds: No wheezing.   Abdominal:      General: There is no distension.      Palpations: Abdomen is soft. There is no mass.      Tenderness: There is no abdominal tenderness.   Musculoskeletal:         General: No swelling.      Right shoulder: Tenderness (muscular) present.      Left shoulder: Normal.      Cervical back: Normal range of motion.      Right upper leg: Tenderness (muscular) present. No swelling, edema, deformity, lacerations or bony tenderness.      Comments: No decreased range of motion   Skin:     Coloration: Skin is not " "jaundiced.      Findings: No rash.   Neurological:      General: No focal deficit present.      Mental Status: He is alert and oriented to person, place, and time.   Psychiatric:         Mood and Affect: Mood normal.         Thought Content: Thought content normal.         Labs:    Complete Blood Count  Lab Results   Component Value Date    RBC 5.04 06/23/2023    HGB 14.7 06/23/2023    HCT 44.5 06/23/2023    MCV 88 06/23/2023    MCH 29.2 06/23/2023    MCHC 33.0 06/23/2023    RDW 12.8 06/23/2023     06/23/2023    MPV 10.7 06/23/2023    GRAN 3.2 06/23/2023    GRAN 49.2 06/23/2023    LYMPH 2.5 06/23/2023    LYMPH 39.3 06/23/2023    MONO 0.6 06/23/2023    MONO 9.3 06/23/2023    EOS 0.1 06/23/2023    BASO 0.04 06/23/2023    EOSINOPHIL 1.4 06/23/2023    BASOPHIL 0.6 06/23/2023    DIFFMETHOD Automated 06/23/2023       Comprehensive Metabolic Panel  No results found for: "GLU", "BUN", "CREATININE", "NA", "K", "CL", "PROT", "ALBUMIN", "BILITOT", "AST", "ALKPHOS", "CO2", "ALT", "ANIONGAP", "EGFRNONAA", "ESTGFRAFRICA"    TSH  No results found for: "TSH"    Imaging:  X-ray Knee Ortho Bilateral with Flexion  Narrative: EXAMINATION:  XR KNEE ORTHO BILAT WITH FLEXION    CLINICAL HISTORY:  Pain in right knee    TECHNIQUE:  AP standing of both knees, PA flexion standing views of both knees, and Merchant views of both knees were performed.  Lateral views of both knees were also performed.    COMPARISON:  04/11/2022    FINDINGS:  Right knee: There is a probable small persistent joint effusion present which appears somewhat improved from prior.  No acute fractures or dislocations visualized.  Small marginal osteophytes are present in the patellofemoral and medial compartments with otherwise preserved joint spaces.    Left knee:  There is no radiographic evidence of acute osseous, articular, or soft tissue abnormality. Small tricompartmental marginal osteophytes are present with slight joint space narrowing in the medial " compartment.  Impression: As above    Electronically signed by: Randy Mccord MD  Date:    04/26/2022  Time:    11:01      Assessment/Plan:    1. Wellness examination    2. Chronic right shoulder pain  -     methylPREDNISolone (MEDROL DOSEPACK) 4 mg tablet; use as directed  Dispense: 21 each; Refill: 0    3. Right thigh pain  -     methylPREDNISolone (MEDROL DOSEPACK) 4 mg tablet; use as directed  Dispense: 21 each; Refill: 0    4. Prostate cancer screening  -     PROSTATE SPECIFIC ANTIGEN, DIAGNOSTIC; Future; Expected date: 05/23/2024    5. Benign prostatic hyperplasia with incomplete bladder emptying  -     PROSTATE SPECIFIC ANTIGEN, DIAGNOSTIC; Future; Expected date: 05/23/2024    6. Essential hypertension  -     CBC Auto Differential; Future  -     Comprehensive metabolic panel; Future; Expected date: 05/23/2024  -     TSH; Future; Expected date: 05/23/2024    7. Pure hypercholesterolemia  -     LIPID PANEL; Future; Expected date: 05/23/2024    8. Hyperglycemia  -     HEMOGLOBIN A1C; Future; Expected date: 05/23/2024    9. Morbid obesity  Assessment & Plan:  Body mass index is 41.13 kg/m².  Discussed diet and exercise           Discussed how to stay healthy including: diet, exercise, refraining from smoking and discussed screening exams / tests needed for age, sex and family Hx.        Fazal Forbes MD

## 2024-05-24 ENCOUNTER — LAB VISIT (OUTPATIENT)
Dept: LAB | Facility: HOSPITAL | Age: 49
End: 2024-05-24
Attending: STUDENT IN AN ORGANIZED HEALTH CARE EDUCATION/TRAINING PROGRAM
Payer: COMMERCIAL

## 2024-05-24 DIAGNOSIS — N40.1 BENIGN PROSTATIC HYPERPLASIA WITH INCOMPLETE BLADDER EMPTYING: ICD-10-CM

## 2024-05-24 DIAGNOSIS — Z12.5 PROSTATE CANCER SCREENING: ICD-10-CM

## 2024-05-24 DIAGNOSIS — R73.9 HYPERGLYCEMIA: ICD-10-CM

## 2024-05-24 DIAGNOSIS — R39.14 BENIGN PROSTATIC HYPERPLASIA WITH INCOMPLETE BLADDER EMPTYING: ICD-10-CM

## 2024-05-24 DIAGNOSIS — I10 ESSENTIAL HYPERTENSION: ICD-10-CM

## 2024-05-24 DIAGNOSIS — E78.00 PURE HYPERCHOLESTEROLEMIA: ICD-10-CM

## 2024-05-24 LAB
ALBUMIN SERPL BCP-MCNC: 3.8 G/DL (ref 3.5–5.2)
ALP SERPL-CCNC: 69 U/L (ref 38–126)
ALT SERPL W/O P-5'-P-CCNC: 25 U/L (ref 10–44)
ANION GAP SERPL CALC-SCNC: 6 MMOL/L (ref 8–16)
AST SERPL-CCNC: 19 U/L (ref 15–46)
BASOPHILS # BLD AUTO: 0.03 K/UL (ref 0–0.2)
BASOPHILS NFR BLD: 0.5 % (ref 0–1.9)
BILIRUB SERPL-MCNC: 0.8 MG/DL (ref 0.1–1)
CALCIUM SERPL-MCNC: 8.8 MG/DL (ref 8.7–10.5)
CHLORIDE SERPL-SCNC: 107 MMOL/L (ref 95–110)
CHOLEST SERPL-MCNC: 135 MG/DL (ref 120–199)
CHOLEST/HDLC SERPL: 4 {RATIO} (ref 2–5)
CO2 SERPL-SCNC: 29 MMOL/L (ref 23–29)
COMPLEXED PSA SERPL-MCNC: 3.2 NG/ML (ref 0–4)
CREAT SERPL-MCNC: 1.12 MG/DL (ref 0.5–1.4)
DIFFERENTIAL METHOD BLD: ABNORMAL
EOSINOPHIL # BLD AUTO: 0.1 K/UL (ref 0–0.5)
EOSINOPHIL NFR BLD: 1.4 % (ref 0–8)
ERYTHROCYTE [DISTWIDTH] IN BLOOD BY AUTOMATED COUNT: 12.6 % (ref 11.5–14.5)
EST. GFR  (NO RACE VARIABLE): >60 ML/MIN/1.73 M^2
ESTIMATED AVG GLUCOSE: 105 MG/DL (ref 68–131)
GLUCOSE SERPL-MCNC: 94 MG/DL (ref 70–110)
HBA1C MFR BLD: 5.3 % (ref 4–5.6)
HCT VFR BLD AUTO: 41.5 % (ref 40–54)
HDLC SERPL-MCNC: 34 MG/DL (ref 40–75)
HDLC SERPL: 25.2 % (ref 20–50)
HGB BLD-MCNC: 13.8 G/DL (ref 14–18)
IMM GRANULOCYTES # BLD AUTO: 0.01 K/UL (ref 0–0.04)
IMM GRANULOCYTES NFR BLD AUTO: 0.2 % (ref 0–0.5)
LDLC SERPL CALC-MCNC: 83.4 MG/DL (ref 63–159)
LYMPHOCYTES # BLD AUTO: 2.4 K/UL (ref 1–4.8)
LYMPHOCYTES NFR BLD: 40.9 % (ref 18–48)
MCH RBC QN AUTO: 29.5 PG (ref 27–31)
MCHC RBC AUTO-ENTMCNC: 33.3 G/DL (ref 32–36)
MCV RBC AUTO: 89 FL (ref 82–98)
MONOCYTES # BLD AUTO: 0.5 K/UL (ref 0.3–1)
MONOCYTES NFR BLD: 8.3 % (ref 4–15)
NEUTROPHILS # BLD AUTO: 2.8 K/UL (ref 1.8–7.7)
NEUTROPHILS NFR BLD: 48.7 % (ref 38–73)
NONHDLC SERPL-MCNC: 101 MG/DL
NRBC BLD-RTO: 0 /100 WBC
PLATELET # BLD AUTO: 204 K/UL (ref 150–450)
PMV BLD AUTO: 11 FL (ref 9.2–12.9)
POTASSIUM SERPL-SCNC: 3.7 MMOL/L (ref 3.5–5.1)
PROT SERPL-MCNC: 6.4 G/DL (ref 6–8.4)
RBC # BLD AUTO: 4.68 M/UL (ref 4.6–6.2)
SODIUM SERPL-SCNC: 142 MMOL/L (ref 136–145)
TRIGL SERPL-MCNC: 88 MG/DL (ref 30–150)
TSH SERPL DL<=0.005 MIU/L-ACNC: 0.89 UIU/ML (ref 0.4–4)
UUN UR-MCNC: 13 MG/DL (ref 2–20)
WBC # BLD AUTO: 5.77 K/UL (ref 3.9–12.7)

## 2024-05-24 PROCEDURE — 36415 COLL VENOUS BLD VENIPUNCTURE: CPT | Mod: PN | Performed by: STUDENT IN AN ORGANIZED HEALTH CARE EDUCATION/TRAINING PROGRAM

## 2024-05-24 PROCEDURE — 84443 ASSAY THYROID STIM HORMONE: CPT | Mod: PN | Performed by: STUDENT IN AN ORGANIZED HEALTH CARE EDUCATION/TRAINING PROGRAM

## 2024-05-24 PROCEDURE — 84153 ASSAY OF PSA TOTAL: CPT | Performed by: STUDENT IN AN ORGANIZED HEALTH CARE EDUCATION/TRAINING PROGRAM

## 2024-05-24 PROCEDURE — 80053 COMPREHEN METABOLIC PANEL: CPT | Mod: PN | Performed by: STUDENT IN AN ORGANIZED HEALTH CARE EDUCATION/TRAINING PROGRAM

## 2024-05-24 PROCEDURE — 83036 HEMOGLOBIN GLYCOSYLATED A1C: CPT | Performed by: STUDENT IN AN ORGANIZED HEALTH CARE EDUCATION/TRAINING PROGRAM

## 2024-05-24 PROCEDURE — 80061 LIPID PANEL: CPT | Performed by: STUDENT IN AN ORGANIZED HEALTH CARE EDUCATION/TRAINING PROGRAM

## 2024-05-24 PROCEDURE — 85025 COMPLETE CBC W/AUTO DIFF WBC: CPT | Mod: PN | Performed by: STUDENT IN AN ORGANIZED HEALTH CARE EDUCATION/TRAINING PROGRAM

## 2025-01-03 ENCOUNTER — OFFICE VISIT (OUTPATIENT)
Dept: FAMILY MEDICINE | Facility: CLINIC | Age: 50
End: 2025-01-03
Payer: COMMERCIAL

## 2025-01-03 ENCOUNTER — LAB VISIT (OUTPATIENT)
Dept: LAB | Facility: HOSPITAL | Age: 50
End: 2025-01-03
Attending: FAMILY MEDICINE
Payer: COMMERCIAL

## 2025-01-03 VITALS
OXYGEN SATURATION: 98 % | WEIGHT: 247.56 LBS | SYSTOLIC BLOOD PRESSURE: 162 MMHG | HEART RATE: 79 BPM | HEIGHT: 64 IN | BODY MASS INDEX: 42.26 KG/M2 | TEMPERATURE: 98 F | DIASTOLIC BLOOD PRESSURE: 108 MMHG

## 2025-01-03 DIAGNOSIS — K21.9 GASTROESOPHAGEAL REFLUX DISEASE, UNSPECIFIED WHETHER ESOPHAGITIS PRESENT: ICD-10-CM

## 2025-01-03 DIAGNOSIS — Z23 NEED FOR INFLUENZA VACCINATION: ICD-10-CM

## 2025-01-03 DIAGNOSIS — I10 ESSENTIAL HYPERTENSION: ICD-10-CM

## 2025-01-03 DIAGNOSIS — E66.01 MORBID OBESITY: ICD-10-CM

## 2025-01-03 DIAGNOSIS — N40.1 BENIGN PROSTATIC HYPERPLASIA WITH INCOMPLETE BLADDER EMPTYING: ICD-10-CM

## 2025-01-03 DIAGNOSIS — Z23 NEED FOR SHINGLES VACCINE: ICD-10-CM

## 2025-01-03 DIAGNOSIS — Z23 NEED FOR PNEUMOCOCCAL 20-VALENT CONJUGATE VACCINATION: ICD-10-CM

## 2025-01-03 DIAGNOSIS — R39.14 BENIGN PROSTATIC HYPERPLASIA WITH INCOMPLETE BLADDER EMPTYING: ICD-10-CM

## 2025-01-03 DIAGNOSIS — E78.00 PURE HYPERCHOLESTEROLEMIA: ICD-10-CM

## 2025-01-03 DIAGNOSIS — I10 ESSENTIAL HYPERTENSION: Primary | ICD-10-CM

## 2025-01-03 DIAGNOSIS — E78.5 HYPERLIPIDEMIA, UNSPECIFIED HYPERLIPIDEMIA TYPE: ICD-10-CM

## 2025-01-03 LAB
ALBUMIN SERPL BCP-MCNC: 4.4 G/DL (ref 3.5–5.2)
ALP SERPL-CCNC: 69 U/L (ref 38–126)
ALT SERPL W/O P-5'-P-CCNC: 35 U/L (ref 10–44)
ANION GAP SERPL CALC-SCNC: 6 MMOL/L (ref 8–16)
AST SERPL-CCNC: 23 U/L (ref 15–46)
BASOPHILS # BLD AUTO: 0.05 K/UL (ref 0–0.2)
BASOPHILS NFR BLD: 0.7 % (ref 0–1.9)
BILIRUB SERPL-MCNC: 0.8 MG/DL (ref 0.1–1)
CALCIUM SERPL-MCNC: 9.3 MG/DL (ref 8.7–10.5)
CHLORIDE SERPL-SCNC: 102 MMOL/L (ref 95–110)
CHOLEST SERPL-MCNC: 210 MG/DL (ref 120–199)
CHOLEST/HDLC SERPL: 4.8 {RATIO} (ref 2–5)
CO2 SERPL-SCNC: 31 MMOL/L (ref 23–29)
CREAT SERPL-MCNC: 1.11 MG/DL (ref 0.5–1.4)
DIFFERENTIAL METHOD BLD: NORMAL
EOSINOPHIL # BLD AUTO: 0 K/UL (ref 0–0.5)
EOSINOPHIL NFR BLD: 0.6 % (ref 0–8)
ERYTHROCYTE [DISTWIDTH] IN BLOOD BY AUTOMATED COUNT: 12.9 % (ref 11.5–14.5)
EST. GFR  (NO RACE VARIABLE): >60 ML/MIN/1.73 M^2
GLUCOSE SERPL-MCNC: 93 MG/DL (ref 70–110)
HCT VFR BLD AUTO: 47.6 % (ref 40–54)
HDLC SERPL-MCNC: 44 MG/DL (ref 40–75)
HDLC SERPL: 21 % (ref 20–50)
HGB BLD-MCNC: 15.9 G/DL (ref 14–18)
IMM GRANULOCYTES # BLD AUTO: 0.02 K/UL (ref 0–0.04)
IMM GRANULOCYTES NFR BLD AUTO: 0.3 % (ref 0–0.5)
LDLC SERPL CALC-MCNC: 145 MG/DL (ref 63–159)
LYMPHOCYTES # BLD AUTO: 2.4 K/UL (ref 1–4.8)
LYMPHOCYTES NFR BLD: 34.9 % (ref 18–48)
MCH RBC QN AUTO: 29.7 PG (ref 27–31)
MCHC RBC AUTO-ENTMCNC: 33.4 G/DL (ref 32–36)
MCV RBC AUTO: 89 FL (ref 82–98)
MONOCYTES # BLD AUTO: 0.6 K/UL (ref 0.3–1)
MONOCYTES NFR BLD: 8.8 % (ref 4–15)
NEUTROPHILS # BLD AUTO: 3.7 K/UL (ref 1.8–7.7)
NEUTROPHILS NFR BLD: 54.7 % (ref 38–73)
NONHDLC SERPL-MCNC: 166 MG/DL
NRBC BLD-RTO: 0 /100 WBC
PLATELET # BLD AUTO: 195 K/UL (ref 150–450)
PMV BLD AUTO: 11 FL (ref 9.2–12.9)
POTASSIUM SERPL-SCNC: 4.2 MMOL/L (ref 3.5–5.1)
PROT SERPL-MCNC: 7.8 G/DL (ref 6–8.4)
RBC # BLD AUTO: 5.36 M/UL (ref 4.6–6.2)
SODIUM SERPL-SCNC: 139 MMOL/L (ref 136–145)
TRIGL SERPL-MCNC: 105 MG/DL (ref 30–150)
UUN UR-MCNC: 14 MG/DL (ref 2–20)
WBC # BLD AUTO: 6.82 K/UL (ref 3.9–12.7)

## 2025-01-03 PROCEDURE — 80053 COMPREHEN METABOLIC PANEL: CPT | Mod: PN | Performed by: FAMILY MEDICINE

## 2025-01-03 PROCEDURE — 90471 IMMUNIZATION ADMIN: CPT | Mod: S$GLB,,, | Performed by: FAMILY MEDICINE

## 2025-01-03 PROCEDURE — 90656 IIV3 VACC NO PRSV 0.5 ML IM: CPT | Mod: S$GLB,,, | Performed by: FAMILY MEDICINE

## 2025-01-03 PROCEDURE — 90472 IMMUNIZATION ADMIN EACH ADD: CPT | Mod: S$GLB,,, | Performed by: FAMILY MEDICINE

## 2025-01-03 PROCEDURE — 36415 COLL VENOUS BLD VENIPUNCTURE: CPT | Mod: PN | Performed by: FAMILY MEDICINE

## 2025-01-03 PROCEDURE — 1159F MED LIST DOCD IN RCRD: CPT | Mod: CPTII,S$GLB,, | Performed by: FAMILY MEDICINE

## 2025-01-03 PROCEDURE — 90750 HZV VACC RECOMBINANT IM: CPT | Mod: S$GLB,,, | Performed by: FAMILY MEDICINE

## 2025-01-03 PROCEDURE — 1160F RVW MEDS BY RX/DR IN RCRD: CPT | Mod: CPTII,S$GLB,, | Performed by: FAMILY MEDICINE

## 2025-01-03 PROCEDURE — 3077F SYST BP >= 140 MM HG: CPT | Mod: CPTII,S$GLB,, | Performed by: FAMILY MEDICINE

## 2025-01-03 PROCEDURE — 3080F DIAST BP >= 90 MM HG: CPT | Mod: CPTII,S$GLB,, | Performed by: FAMILY MEDICINE

## 2025-01-03 PROCEDURE — 3008F BODY MASS INDEX DOCD: CPT | Mod: CPTII,S$GLB,, | Performed by: FAMILY MEDICINE

## 2025-01-03 PROCEDURE — 99214 OFFICE O/P EST MOD 30 MIN: CPT | Mod: 25,S$GLB,, | Performed by: FAMILY MEDICINE

## 2025-01-03 PROCEDURE — 85025 COMPLETE CBC W/AUTO DIFF WBC: CPT | Mod: PN | Performed by: FAMILY MEDICINE

## 2025-01-03 PROCEDURE — 80061 LIPID PANEL: CPT | Performed by: FAMILY MEDICINE

## 2025-01-03 RX ORDER — TAMSULOSIN HYDROCHLORIDE 0.4 MG/1
0.4 CAPSULE ORAL DAILY
Qty: 90 CAPSULE | Refills: 3 | Status: SHIPPED | OUTPATIENT
Start: 2025-01-03

## 2025-01-03 RX ORDER — LOSARTAN POTASSIUM AND HYDROCHLOROTHIAZIDE 25; 100 MG/1; MG/1
1 TABLET ORAL DAILY
Qty: 90 TABLET | Refills: 3 | Status: SHIPPED | OUTPATIENT
Start: 2025-01-03 | End: 2026-01-03

## 2025-01-03 RX ORDER — ATORVASTATIN CALCIUM 10 MG/1
10 TABLET, FILM COATED ORAL DAILY
Qty: 90 TABLET | Refills: 3 | Status: SHIPPED | OUTPATIENT
Start: 2025-01-03

## 2025-01-03 NOTE — LETTER
January 3, 2025    Manjit Silverio  [address] [address]             [facility]  Family Medicine  [address] [address]  Phone: [phone]  Fax: [phone]   January 3, 2025     Patient: Manjit Silverio   YOB: 1975   Date of Visit: 1/3/2025       To Whom it May Concern:    Manjit Silverio was seen in my clinic on 1/3/2025.     Please excuse him from any classes or work missed.    If you have any questions or concerns, please don't hesitate to call.    Sincerely,         Armen Bazan MD

## 2025-01-03 NOTE — LETTER
January 3, 2025    Manjit Silverio  [address] [address]             [facility]  Family Medicine  [address] [address]  Phone: [phone]  Fax: [phone]   January 3, 2025     Patient: Manjit Silverio   YOB: 1975   Date of Visit: 1/3/2025       To Whom it May Concern:    Manjit Silverio was seen in my clinic on 1/3/2025. He may return to work on 1/6/25 .    Please excuse him from any classes or work missed.    If you have any questions or concerns, please don't hesitate to call.    Sincerely,         Armen Bazan MD

## 2025-01-03 NOTE — PROGRESS NOTES
"Subjective:      Patient ID: Manjit Silverio is a 49 y.o. male.    Chief Complaint: Follow-up (6 mon)      Vitals:    01/03/25 1032   BP: (!) 162/108   Pulse: 79   Temp: 97.9 °F (36.6 °C)   TempSrc: Oral   SpO2: 98%   Weight: 112.3 kg (247 lb 9.2 oz)   Height: 5' 4" (1.626 m)        HPI   Check up; out of BP meds, need refill for protonix for GERD  Out of lipid Rx, needs labs    Problem List  Patient Active Problem List   Diagnosis    Incomplete RBBB    Essential hypertension    Morbid obesity    Pure hypercholesterolemia    Gastroesophageal reflux disease    Benign prostatic hyperplasia with incomplete bladder emptying        ALLERGIES: Review of patient's allergies indicates:  No Known Allergies    MEDS:   Current Outpatient Medications:     atorvastatin (LIPITOR) 10 MG tablet, Take 1 tablet (10 mg total) by mouth once daily., Disp: 90 tablet, Rfl: 3    losartan-hydrochlorothiazide 100-25 mg (HYZAAR) 100-25 mg per tablet, Take 1 tablet by mouth once daily. For blood pressure, Disp: 90 tablet, Rfl: 3    tamsulosin (FLOMAX) 0.4 mg Cap, Take 1 capsule (0.4 mg total) by mouth once daily., Disp: 90 capsule, Rfl: 3      History:  Current Providers as of 1/3/2025  PCP: Fazal Forbes MD  Care Team Provider: Roberto Webster LPN  Care Team Provider: Roberto Webster LPN  Care Team Provider: Charlotte Costa MA  Encounter Provider: Armen Bazan MD, starting on Fri Manish 3, 2025 12:00 AM  Referring Provider: not found, starting on Fri Manish 3, 2025 12:00 AM  Consulting Physician: Armen Bazan MD, starting on Fri Manish 3, 2025 10:28 AM (Active)   Past Medical History:   Diagnosis Date    Hyperlipidemia     Hypertension      Past Surgical History:   Procedure Laterality Date    COLONOSCOPY N/A 6/10/2022    Procedure: COLONOSCOPY;  Surgeon: Randy Joaquin MD;  Location: [facility];  Service: General;  Laterality: N/A;     Social History     Tobacco Use    Smoking status: Never     Passive exposure: Never    Smokeless " tobacco: Never   Substance Use Topics    Alcohol use: No         Review of Systems   Constitutional:  Negative for chills.   Gastrointestinal:  Negative for constipation, nausea and vomiting.   Genitourinary:  Positive for dysuria, frequency and urgency. Negative for flank pain and hematuria.   Skin:  Negative for rash.   All other systems reviewed and are negative.    Objective:     Physical Exam  Vitals and nursing note reviewed.   Constitutional:       General: He is not in acute distress.     Appearance: He is well-developed. He is obese. He is not diaphoretic.   HENT:      Head: Normocephalic and atraumatic.      Right Ear: External ear normal.      Left Ear: External ear normal.      Mouth/Throat:      Pharynx: No oropharyngeal exudate.   Eyes:      General: No scleral icterus.        Right eye: No discharge.         Left eye: No discharge.      Conjunctiva/sclera: Conjunctivae normal.      Pupils: Pupils are equal, round, and reactive to light.   Neck:      Thyroid: No thyromegaly.      Vascular: No JVD.      Trachea: No tracheal deviation.   Cardiovascular:      Rate and Rhythm: Normal rate and regular rhythm.      Heart sounds: No murmur heard.     No friction rub. No gallop.   Pulmonary:      Effort: Pulmonary effort is normal. No respiratory distress.      Breath sounds: Normal breath sounds. No stridor. No wheezing or rales.   Chest:      Chest wall: No tenderness.   Abdominal:      General: There is no distension.      Palpations: Abdomen is soft. There is no mass.      Tenderness: There is no abdominal tenderness. There is no guarding or rebound.   Musculoskeletal:         General: No tenderness. Normal range of motion.      Cervical back: Normal range of motion and neck supple.   Lymphadenopathy:      Cervical: No cervical adenopathy.   Skin:     General: Skin is warm and dry.      Coloration: Skin is not pale.      Findings: No erythema or rash.   Neurological:      General: No focal deficit present.       Mental Status: He is alert and oriented to person, place, and time. Mental status is at baseline.      Cranial Nerves: No cranial nerve deficit.      Motor: No abnormal muscle tone.      Coordination: Coordination normal.      Deep Tendon Reflexes: Reflexes are normal and symmetric. Reflexes normal.   Psychiatric:         Mood and Affect: Mood normal.         Behavior: Behavior normal.         Thought Content: Thought content normal.         Judgment: Judgment normal.             Assessment:     1. Essential hypertension    2. Need for influenza vaccination    3. Need for shingles vaccine    4. Need for pneumococcal 20-valent conjugate vaccination    5. Morbid obesity    6. Pure hypercholesterolemia    7. Benign prostatic hyperplasia with incomplete bladder emptying    8. Gastroesophageal reflux disease, unspecified whether esophagitis present    9. Hyperlipidemia, unspecified hyperlipidemia type      Plan:        Medication List            Accurate as of January 3, 2025 11:59 PM. If you have any questions, ask your nurse or doctor.                START taking these medications      losartan-hydrochlorothiazide 100-25 mg 100-25 mg per tablet  Commonly known as: HYZAAR  Take 1 tablet by mouth once daily. For blood pressure  Started by: Armen Bazan MD            CONTINUE taking these medications      atorvastatin 10 MG tablet  Commonly known as: LIPITOR  Take 1 tablet (10 mg total) by mouth once daily.     tamsulosin 0.4 mg Cap  Commonly known as: FLOMAX  Take 1 capsule (0.4 mg total) by mouth once daily.               Where to Get Your Medications        These medications were sent to [facility]/pharmacy #[id number] - [address] AT [address] OF [address] [address]      Phone: [phone]   atorvastatin 10 MG tablet  losartan-hydrochlorothiazide 100-25 mg 100-25 mg per tablet  tamsulosin 0.4 mg Cap       Essential hypertension  -     CBC Auto Differential;  Future; Expected date: 01/03/2025  -     Comprehensive Metabolic Panel; Future; Expected date: 01/03/2025  -     Lipid Panel; Future    Need for influenza vaccination  -     influenza (Flulaval, Fluzone, Fluarix) 45 mcg/0.5 mL IM vaccine (> or = 6 mo) 0.5 mL  -     CBC Auto Differential; Future; Expected date: 01/03/2025  -     Comprehensive Metabolic Panel; Future; Expected date: 01/03/2025  -     Lipid Panel; Future    Need for shingles vaccine  -     varicella zoster (Shingrix) IM vaccine (>/= 49 yo)  -     CBC Auto Differential; Future; Expected date: 01/03/2025  -     Comprehensive Metabolic Panel; Future; Expected date: 01/03/2025  -     Lipid Panel; Future    Need for pneumococcal 20-valent conjugate vaccination  -     CBC Auto Differential; Future; Expected date: 01/03/2025  -     Comprehensive Metabolic Panel; Future; Expected date: 01/03/2025  -     Lipid Panel; Future    Morbid obesity  -     CBC Auto Differential; Future; Expected date: 01/03/2025  -     Comprehensive Metabolic Panel; Future; Expected date: 01/03/2025  -     Lipid Panel; Future    Pure hypercholesterolemia  -     atorvastatin (LIPITOR) 10 MG tablet; Take 1 tablet (10 mg total) by mouth once daily.  Dispense: 90 tablet; Refill: 3  -     CBC Auto Differential; Future; Expected date: 01/03/2025  -     Comprehensive Metabolic Panel; Future; Expected date: 01/03/2025  -     Lipid Panel; Future    Benign prostatic hyperplasia with incomplete bladder emptying  -     CBC Auto Differential; Future; Expected date: 01/03/2025  -     Comprehensive Metabolic Panel; Future; Expected date: 01/03/2025  -     Lipid Panel; Future    Gastroesophageal reflux disease, unspecified whether esophagitis present  -     CBC Auto Differential; Future; Expected date: 01/03/2025  -     Comprehensive Metabolic Panel; Future; Expected date: 01/03/2025  -     Lipid Panel; Future    Hyperlipidemia, unspecified hyperlipidemia type  -     atorvastatin (LIPITOR) 10 MG  tablet; Take 1 tablet (10 mg total) by mouth once daily.  Dispense: 90 tablet; Refill: 3  -     CBC Auto Differential; Future; Expected date: 01/03/2025  -     Comprehensive Metabolic Panel; Future; Expected date: 01/03/2025  -     Lipid Panel; Future    Other orders  -     Discontinue: pneumoc 20-patrick conj-dip cr(PF) (PREVNAR-20 (PF)) injection Syrg 0.5 mL  -     tamsulosin (FLOMAX) 0.4 mg Cap; Take 1 capsule (0.4 mg total) by mouth once daily.  Dispense: 90 capsule; Refill: 3  -     losartan-hydrochlorothiazide 100-25 mg (HYZAAR) 100-25 mg per tablet; Take 1 tablet by mouth once daily. For blood pressure  Dispense: 90 tablet; Refill: 3          Answers submitted by the patient for this visit:  Painful Urination Questionnaire (Submitted on 1/2/2025)  Chief Complaint: Dysuria  Chronicity: new  Onset: 1 to 4 weeks ago  Frequency: intermittently  Progression since onset: unchanged  Pain quality: aching  Pain - numeric: 2/10  Fever: no fever  Sexually active?: Yes  History of pyelonephritis?: No  discharge: No  hesitancy: Yes  possible pregnancy: No  sweats: No  weight loss: No  withholding: No  behavior changes: No  Treatments tried: nothing  Improvement on treatment: no relief  Pain severity: mild  catheterization: No  diabetes insipidus: No  diabetes mellitus: No  genitourinary reflux: No  hypertension: Yes  recurrent UTIs: No  single kidney: No  STD: No  urinary stasis: No  urological procedure: No  kidney stones: No

## 2025-01-10 ENCOUNTER — PATIENT MESSAGE (OUTPATIENT)
Dept: URGENT CARE | Facility: CLINIC | Age: 50
End: 2025-01-10
Payer: COMMERCIAL

## 2025-01-24 ENCOUNTER — TELEPHONE (OUTPATIENT)
Dept: FAMILY MEDICINE | Facility: CLINIC | Age: 50
End: 2025-01-24

## 2025-01-24 ENCOUNTER — CLINICAL SUPPORT (OUTPATIENT)
Dept: FAMILY MEDICINE | Facility: CLINIC | Age: 50
End: 2025-01-24
Payer: COMMERCIAL

## 2025-01-24 VITALS — OXYGEN SATURATION: 96 % | HEART RATE: 85 BPM | SYSTOLIC BLOOD PRESSURE: 138 MMHG | DIASTOLIC BLOOD PRESSURE: 86 MMHG

## 2025-01-24 DIAGNOSIS — Z01.30 BP CHECK: Primary | ICD-10-CM

## 2025-01-24 NOTE — TELEPHONE ENCOUNTER
Pt came in for BP check.    BP: 138/86 P: 85 O2: 96%    He's scheduled to follow up with you in May.

## 2025-01-24 NOTE — PROGRESS NOTES
Manjit Silverio 49 y.o. male is here today for Blood Pressure check.   History of HTN yes.    Review of patient's allergies indicates:  No Known Allergies  Creatinine   Date Value Ref Range Status   01/03/2025 1.11 0.50 - 1.40 mg/dL Final     Sodium   Date Value Ref Range Status   01/03/2025 139 136 - 145 mmol/L Final     Potassium   Date Value Ref Range Status   01/03/2025 4.2 3.5 - 5.1 mmol/L Final   ]  Patient verifies taking blood pressure medications on a regular basis at the same time of the day.     Current Outpatient Medications:     atorvastatin (LIPITOR) 10 MG tablet, Take 1 tablet (10 mg total) by mouth once daily., Disp: 90 tablet, Rfl: 3    losartan-hydrochlorothiazide 100-25 mg (HYZAAR) 100-25 mg per tablet, Take 1 tablet by mouth once daily. For blood pressure, Disp: 90 tablet, Rfl: 3    tamsulosin (FLOMAX) 0.4 mg Cap, Take 1 capsule (0.4 mg total) by mouth once daily., Disp: 90 capsule, Rfl: 3  Does patient have record of home blood pressure readings no.    Last dose of blood pressure medication was taken at 2:30 am.  Patient is asymptomatic.     BP: 138/86 , Pulse: 85 .    Blood pressure reading after 15 minutes was 138/86, Pulse 85.  Dr. Forbes notified.

## 2025-03-31 ENCOUNTER — OFFICE VISIT (OUTPATIENT)
Dept: FAMILY MEDICINE | Facility: CLINIC | Age: 50
End: 2025-03-31
Payer: COMMERCIAL

## 2025-03-31 ENCOUNTER — PATIENT MESSAGE (OUTPATIENT)
Dept: FAMILY MEDICINE | Facility: CLINIC | Age: 50
End: 2025-03-31

## 2025-03-31 VITALS
TEMPERATURE: 98 F | SYSTOLIC BLOOD PRESSURE: 150 MMHG | OXYGEN SATURATION: 95 % | DIASTOLIC BLOOD PRESSURE: 90 MMHG | BODY MASS INDEX: 43.84 KG/M2 | HEIGHT: 64 IN | HEART RATE: 93 BPM | WEIGHT: 256.81 LBS

## 2025-03-31 DIAGNOSIS — N40.1 BENIGN PROSTATIC HYPERPLASIA WITH INCOMPLETE BLADDER EMPTYING: ICD-10-CM

## 2025-03-31 DIAGNOSIS — E66.01 MORBID OBESITY: ICD-10-CM

## 2025-03-31 DIAGNOSIS — I10 ESSENTIAL HYPERTENSION: ICD-10-CM

## 2025-03-31 DIAGNOSIS — Z00.00 WELLNESS EXAMINATION: Primary | ICD-10-CM

## 2025-03-31 DIAGNOSIS — R39.14 BENIGN PROSTATIC HYPERPLASIA WITH INCOMPLETE BLADDER EMPTYING: ICD-10-CM

## 2025-03-31 DIAGNOSIS — E78.2 MIXED HYPERLIPIDEMIA: ICD-10-CM

## 2025-03-31 DIAGNOSIS — E78.5 HYPERLIPIDEMIA, UNSPECIFIED HYPERLIPIDEMIA TYPE: ICD-10-CM

## 2025-03-31 PROCEDURE — 3008F BODY MASS INDEX DOCD: CPT | Mod: CPTII,S$GLB,, | Performed by: STUDENT IN AN ORGANIZED HEALTH CARE EDUCATION/TRAINING PROGRAM

## 2025-03-31 PROCEDURE — 99396 PREV VISIT EST AGE 40-64: CPT | Mod: S$GLB,,, | Performed by: STUDENT IN AN ORGANIZED HEALTH CARE EDUCATION/TRAINING PROGRAM

## 2025-03-31 PROCEDURE — 1159F MED LIST DOCD IN RCRD: CPT | Mod: CPTII,S$GLB,, | Performed by: STUDENT IN AN ORGANIZED HEALTH CARE EDUCATION/TRAINING PROGRAM

## 2025-03-31 PROCEDURE — 3080F DIAST BP >= 90 MM HG: CPT | Mod: CPTII,S$GLB,, | Performed by: STUDENT IN AN ORGANIZED HEALTH CARE EDUCATION/TRAINING PROGRAM

## 2025-03-31 PROCEDURE — 1160F RVW MEDS BY RX/DR IN RCRD: CPT | Mod: CPTII,S$GLB,, | Performed by: STUDENT IN AN ORGANIZED HEALTH CARE EDUCATION/TRAINING PROGRAM

## 2025-03-31 PROCEDURE — 3077F SYST BP >= 140 MM HG: CPT | Mod: CPTII,S$GLB,, | Performed by: STUDENT IN AN ORGANIZED HEALTH CARE EDUCATION/TRAINING PROGRAM

## 2025-03-31 RX ORDER — TAMSULOSIN HYDROCHLORIDE 0.4 MG/1
0.4 CAPSULE ORAL DAILY
Qty: 90 CAPSULE | Refills: 3 | Status: SHIPPED | OUTPATIENT
Start: 2025-03-31

## 2025-03-31 RX ORDER — TIRZEPATIDE 2.5 MG/.5ML
2.5 INJECTION, SOLUTION SUBCUTANEOUS
Qty: 4 PEN | Refills: 3 | Status: SHIPPED | OUTPATIENT
Start: 2025-03-31

## 2025-03-31 RX ORDER — ATORVASTATIN CALCIUM 10 MG/1
10 TABLET, FILM COATED ORAL DAILY
Qty: 90 TABLET | Refills: 3 | Status: SHIPPED | OUTPATIENT
Start: 2025-03-31

## 2025-03-31 RX ORDER — LOSARTAN POTASSIUM AND HYDROCHLOROTHIAZIDE 25; 100 MG/1; MG/1
1 TABLET ORAL DAILY
Qty: 90 TABLET | Refills: 3 | Status: SHIPPED | OUTPATIENT
Start: 2025-03-31 | End: 2026-03-31

## 2025-03-31 NOTE — PROGRESS NOTES
Patient ID: Manjit Silverio is a 49 y.o. male.     Chief Complaint: annual    Follow-up  Pertinent negatives include no chest pain, coughing, fever, headaches, myalgias, nausea, rash, vomiting or weakness.   Hypertension  This is a recurrent problem. The current episode started more than 1 year ago. Associated symptoms include anxiety. Pertinent negatives include no chest pain, headaches or shortness of breath. There are no associated agents to hypertension. Risk factors for coronary artery disease include dyslipidemia. Past treatments include nothing. The current treatment provides mild improvement. Compliance problems include diet and psychosocial issues.      History of Present Illness    Manjit presents today for follow up    He reports missing blood pressure medication doses for 2 days, taking only one dose during this period due to running out of medication.    He reports weight fluctuations with cycles of loss and gain following his divorce.    He denies bowel problems, hearing problems, chest pain, or shortness of breath.         Review of Systems  Review of Systems   Constitutional:  Negative for fever.   HENT:  Negative for ear pain and sinus pain.    Eyes:  Negative for discharge.   Respiratory:  Negative for cough and shortness of breath.    Cardiovascular:  Negative for chest pain and leg swelling.   Gastrointestinal:  Negative for diarrhea, nausea and vomiting.   Genitourinary:  Negative for urgency.   Musculoskeletal:  Negative for myalgias.   Skin:  Negative for rash.   Neurological:  Negative for weakness and headaches.   Psychiatric/Behavioral:  Negative for depression.    All other systems reviewed and are negative.      Currently Medications  Medications Ordered Prior to Encounter[1]    Physical  Exam  Vitals:    03/31/25 0959   BP: (!) 150/90   BP Location: Left arm   Patient Position: Sitting   Pulse: 93   Temp: 97.9 °F (36.6 °C)   TempSrc: Oral   SpO2: 95%   Weight: 116.5 kg (256 lb 13.4 oz)  "  Height: 5' 4" (1.626 m)      Body mass index is 44.09 kg/m².  Wt Readings from Last 3 Encounters:   03/31/25 116.5 kg (256 lb 13.4 oz)   01/03/25 112.3 kg (247 lb 9.2 oz)   05/23/24 108.7 kg (239 lb 10.2 oz)       Physical Exam  Vitals and nursing note reviewed.   Constitutional:       General: He is not in acute distress.     Appearance: He is obese. He is not ill-appearing.   HENT:      Head: Normocephalic and atraumatic.      Right Ear: External ear normal.      Left Ear: External ear normal.      Nose: Nose normal.      Mouth/Throat:      Mouth: Mucous membranes are moist.   Eyes:      Extraocular Movements: Extraocular movements intact.      Conjunctiva/sclera: Conjunctivae normal.   Cardiovascular:      Rate and Rhythm: Normal rate and regular rhythm.      Pulses: Normal pulses.      Heart sounds: No murmur heard.  Pulmonary:      Effort: Pulmonary effort is normal. No respiratory distress.      Breath sounds: No wheezing.   Abdominal:      General: There is no distension.      Palpations: Abdomen is soft. There is no mass.      Tenderness: There is no abdominal tenderness.   Musculoskeletal:         General: No swelling.      Cervical back: Normal range of motion.   Skin:     Coloration: Skin is not jaundiced.      Findings: No rash.   Neurological:      General: No focal deficit present.      Mental Status: He is alert and oriented to person, place, and time.   Psychiatric:         Mood and Affect: Mood normal.         Thought Content: Thought content normal.         Labs:    Complete Blood Count  Lab Results   Component Value Date    RBC 5.36 01/03/2025    HGB 15.9 01/03/2025    HCT 47.6 01/03/2025    MCV 89 01/03/2025    MCH 29.7 01/03/2025    MCHC 33.4 01/03/2025    RDW 12.9 01/03/2025     01/03/2025    MPV 11.0 01/03/2025    GRAN 3.7 01/03/2025    GRAN 54.7 01/03/2025    LYMPH 2.4 01/03/2025    LYMPH 34.9 01/03/2025    MONO 0.6 01/03/2025    MONO 8.8 01/03/2025    EOS 0.0 01/03/2025    BASO 0.05 " "01/03/2025    EOSINOPHIL 0.6 01/03/2025    BASOPHIL 0.7 01/03/2025    DIFFMETHOD Automated 01/03/2025       Comprehensive Metabolic Panel  Lab Results   Component Value Date    GLU 93 01/03/2025    BUN 14 01/03/2025    CREATININE 1.11 01/03/2025     01/03/2025    K 4.2 01/03/2025     01/03/2025    PROT 7.8 01/03/2025    ALBUMIN 4.4 01/03/2025    BILITOT 0.8 01/03/2025    AST 23 01/03/2025    ALKPHOS 69 01/03/2025    CO2 31 (H) 01/03/2025    ALT 35 01/03/2025    ANIONGAP 6 (L) 01/03/2025       TSH  No results found for: "TSH"    Imaging:  X-ray Knee Ortho Bilateral with Flexion  Narrative: EXAMINATION:  XR KNEE ORTHO BILAT WITH FLEXION    CLINICAL HISTORY:  Pain in right knee    TECHNIQUE:  AP standing of both knees, PA flexion standing views of both knees, and Merchant views of both knees were performed.  Lateral views of both knees were also performed.    COMPARISON:  04/11/2022    FINDINGS:  Right knee: There is a probable small persistent joint effusion present which appears somewhat improved from prior.  No acute fractures or dislocations visualized.  Small marginal osteophytes are present in the patellofemoral and medial compartments with otherwise preserved joint spaces.    Left knee:  There is no radiographic evidence of acute osseous, articular, or soft tissue abnormality. Small tricompartmental marginal osteophytes are present with slight joint space narrowing in the medial compartment.  Impression: As above    Electronically signed by: Randy Mccord MD  Date:    04/26/2022  Time:    11:01      Assessment/Plan:  Assessment & Plan    Assessed BP control, noting recent medication non-adherence.  Evaluated weight management options and started tirzepatide (injectable weight loss medication).  Reviewed January labs, noting slightly elevated cholesterol and otherwise normal results.    ESSENTIAL HYPERTENSION:  - Evaluated the patient's blood pressure, which is currently elevated.  - Acknowledged " the need to monitor blood pressure.  - Ensured the patient is back on blood pressure medication.  - Scheduled a follow-up blood pressure check in 2 weeks to ensure BP has normalized with resumed medication regimen.      WEIGHT MANAGEMENT:  - Noted that the patient reports fluctuating weight.  - Acknowledged weight issues and considered weight loss medication.  - Proposed injectable weight loss medication (Tirzepatide) to be administered once a week.          1. Wellness examination    2. Mixed hyperlipidemia  -     atorvastatin (LIPITOR) 10 MG tablet; Take 1 tablet (10 mg total) by mouth once daily.  Dispense: 90 tablet; Refill: 3    3. Hyperlipidemia, unspecified hyperlipidemia type  -     atorvastatin (LIPITOR) 10 MG tablet; Take 1 tablet (10 mg total) by mouth once daily.  Dispense: 90 tablet; Refill: 3  -     tirzepatide, weight loss, (ZEPBOUND) 2.5 mg/0.5 mL PnIj; Inject 2.5 mg into the skin every 7 days.  Dispense: 4 Pen; Refill: 3    4. Morbid obesity  -     tirzepatide, weight loss, (ZEPBOUND) 2.5 mg/0.5 mL PnIj; Inject 2.5 mg into the skin every 7 days.  Dispense: 4 Pen; Refill: 3    5. Essential hypertension  -     losartan-hydrochlorothiazide 100-25 mg (HYZAAR) 100-25 mg per tablet; Take 1 tablet by mouth once daily. For blood pressure  Dispense: 90 tablet; Refill: 3    6. Benign prostatic hyperplasia with incomplete bladder emptying  -     tamsulosin (FLOMAX) 0.4 mg Cap; Take 1 capsule (0.4 mg total) by mouth once daily.  Dispense: 90 capsule; Refill: 3         Discussed how to stay healthy including: diet, exercise, refraining from smoking and discussed screening exams / tests needed for age, sex and family Hx.    RTC 1 year    Fazal Forbes MD    This note was generated with the assistance of ambient listening technology. Verbal consent was obtained by the patient and accompanying visitor(s) for the recording of patient appointment to facilitate this note. I attest to having reviewed and edited the  generated note for accuracy, though some syntax or spelling errors may persist. Please contact the author of this note for any clarification.         [1]   Current Outpatient Medications on File Prior to Visit   Medication Sig Dispense Refill    [DISCONTINUED] atorvastatin (LIPITOR) 10 MG tablet Take 1 tablet (10 mg total) by mouth once daily. 90 tablet 3    [DISCONTINUED] losartan-hydrochlorothiazide 100-25 mg (HYZAAR) 100-25 mg per tablet Take 1 tablet by mouth once daily. For blood pressure 90 tablet 3    [DISCONTINUED] tamsulosin (FLOMAX) 0.4 mg Cap Take 1 capsule (0.4 mg total) by mouth once daily. 90 capsule 3     No current facility-administered medications on file prior to visit.

## 2025-04-02 ENCOUNTER — PATIENT MESSAGE (OUTPATIENT)
Dept: ADMINISTRATIVE | Facility: HOSPITAL | Age: 50
End: 2025-04-02
Payer: COMMERCIAL

## 2025-05-05 ENCOUNTER — PATIENT MESSAGE (OUTPATIENT)
Dept: FAMILY MEDICINE | Facility: CLINIC | Age: 50
End: 2025-05-05

## 2025-05-05 ENCOUNTER — OFFICE VISIT (OUTPATIENT)
Dept: FAMILY MEDICINE | Facility: CLINIC | Age: 50
End: 2025-05-05
Payer: COMMERCIAL

## 2025-05-05 VITALS
WEIGHT: 255.94 LBS | HEIGHT: 64 IN | BODY MASS INDEX: 43.69 KG/M2 | TEMPERATURE: 98 F | SYSTOLIC BLOOD PRESSURE: 120 MMHG | OXYGEN SATURATION: 98 % | DIASTOLIC BLOOD PRESSURE: 82 MMHG | HEART RATE: 75 BPM

## 2025-05-05 DIAGNOSIS — R39.14 BENIGN PROSTATIC HYPERPLASIA WITH INCOMPLETE BLADDER EMPTYING: ICD-10-CM

## 2025-05-05 DIAGNOSIS — E66.01 CLASS 3 SEVERE OBESITY DUE TO EXCESS CALORIES WITH SERIOUS COMORBIDITY AND BODY MASS INDEX (BMI) OF 40.0 TO 44.9 IN ADULT: ICD-10-CM

## 2025-05-05 DIAGNOSIS — E78.5 HYPERLIPIDEMIA, UNSPECIFIED HYPERLIPIDEMIA TYPE: ICD-10-CM

## 2025-05-05 DIAGNOSIS — I10 ESSENTIAL HYPERTENSION: Primary | ICD-10-CM

## 2025-05-05 DIAGNOSIS — E78.2 MIXED HYPERLIPIDEMIA: ICD-10-CM

## 2025-05-05 DIAGNOSIS — N40.1 BENIGN PROSTATIC HYPERPLASIA WITH INCOMPLETE BLADDER EMPTYING: ICD-10-CM

## 2025-05-05 DIAGNOSIS — E66.813 CLASS 3 SEVERE OBESITY DUE TO EXCESS CALORIES WITH SERIOUS COMORBIDITY AND BODY MASS INDEX (BMI) OF 40.0 TO 44.9 IN ADULT: ICD-10-CM

## 2025-05-05 RX ORDER — PHENTERMINE HYDROCHLORIDE 37.5 MG/1
37.5 TABLET ORAL
Qty: 30 TABLET | Refills: 0 | Status: SHIPPED | OUTPATIENT
Start: 2025-05-05

## 2025-05-05 RX ORDER — TAMSULOSIN HYDROCHLORIDE 0.4 MG/1
0.4 CAPSULE ORAL DAILY
Qty: 90 CAPSULE | Refills: 3 | Status: SHIPPED | OUTPATIENT
Start: 2025-05-05

## 2025-05-05 RX ORDER — LOSARTAN POTASSIUM AND HYDROCHLOROTHIAZIDE 25; 100 MG/1; MG/1
1 TABLET ORAL DAILY
Qty: 90 TABLET | Refills: 3 | Status: SHIPPED | OUTPATIENT
Start: 2025-05-05 | End: 2026-05-05

## 2025-05-05 RX ORDER — ATORVASTATIN CALCIUM 10 MG/1
10 TABLET, FILM COATED ORAL DAILY
Qty: 90 TABLET | Refills: 3 | Status: SHIPPED | OUTPATIENT
Start: 2025-05-05

## 2025-05-05 NOTE — PROGRESS NOTES
" Patient ID: Manjit Silverio is a 49 y.o. male.     Chief Complaint: wellness    Follow-up  Pertinent negatives include no chest pain, coughing, fever, headaches, myalgias, nausea, rash, vomiting or weakness.     History of Present Illness    Manjit presents today for follow up    He reports normal bowel movements and denies urinary problems, chest pain, and shortness of breath.    He is currently taking Atorvastatin, Hydrochlorothiazide, and Tamsulosin.       He would like to discuss treatment for obesity. Zepbound was not covered by insurance.     Review of Systems  Review of Systems   Constitutional:  Negative for fever.   HENT:  Negative for ear pain and sinus pain.    Eyes:  Negative for discharge.   Respiratory:  Negative for cough and shortness of breath.    Cardiovascular:  Negative for chest pain and leg swelling.   Gastrointestinal:  Negative for diarrhea, nausea and vomiting.   Genitourinary:  Negative for urgency.   Musculoskeletal:  Negative for myalgias.   Skin:  Negative for rash.   Neurological:  Negative for weakness and headaches.   Psychiatric/Behavioral:  Negative for depression.    All other systems reviewed and are negative.      Currently Medications  Medications Ordered Prior to Encounter[1]    Physical  Exam  Vitals:    05/05/25 0838   BP: 120/82   BP Location: Right arm   Pulse: 75   Temp: 97.7 °F (36.5 °C)   SpO2: 98%   Weight: 116.1 kg (255 lb 15.3 oz)   Height: 5' 4" (1.626 m)      Body mass index is 43.93 kg/m².  Wt Readings from Last 3 Encounters:   05/05/25 116.1 kg (255 lb 15.3 oz)   03/31/25 116.5 kg (256 lb 13.4 oz)   01/03/25 112.3 kg (247 lb 9.2 oz)       Physical Exam  Vitals and nursing note reviewed.   Constitutional:       General: He is not in acute distress.     Appearance: He is obese. He is not ill-appearing.   HENT:      Head: Normocephalic and atraumatic.      Right Ear: External ear normal.      Left Ear: External ear normal.      Nose: Nose normal.      Mouth/Throat: "      Mouth: Mucous membranes are moist.   Eyes:      Extraocular Movements: Extraocular movements intact.      Conjunctiva/sclera: Conjunctivae normal.   Cardiovascular:      Rate and Rhythm: Normal rate and regular rhythm.      Pulses: Normal pulses.      Heart sounds: No murmur heard.  Pulmonary:      Effort: Pulmonary effort is normal. No respiratory distress.      Breath sounds: No wheezing.   Abdominal:      General: There is no distension.      Palpations: Abdomen is soft. There is no mass.      Tenderness: There is no abdominal tenderness.   Musculoskeletal:         General: No swelling.      Cervical back: Normal range of motion.   Skin:     Coloration: Skin is not jaundiced.      Findings: No rash.   Neurological:      General: No focal deficit present.      Mental Status: He is alert and oriented to person, place, and time.   Psychiatric:         Mood and Affect: Mood normal.         Thought Content: Thought content normal.         Labs:    Complete Blood Count  Lab Results   Component Value Date    RBC 5.36 01/03/2025    HGB 15.9 01/03/2025    HCT 47.6 01/03/2025    MCV 89 01/03/2025    MCH 29.7 01/03/2025    MCHC 33.4 01/03/2025    RDW 12.9 01/03/2025     01/03/2025    MPV 11.0 01/03/2025    GRAN 3.7 01/03/2025    GRAN 54.7 01/03/2025    LYMPH 2.4 01/03/2025    LYMPH 34.9 01/03/2025    MONO 0.6 01/03/2025    MONO 8.8 01/03/2025    EOS 0.0 01/03/2025    BASO 0.05 01/03/2025    EOSINOPHIL 0.6 01/03/2025    BASOPHIL 0.7 01/03/2025    DIFFMETHOD Automated 01/03/2025       Comprehensive Metabolic Panel  Lab Results   Component Value Date    GLU 93 01/03/2025    BUN 14 01/03/2025    CREATININE 1.11 01/03/2025     01/03/2025    K 4.2 01/03/2025     01/03/2025    PROT 7.8 01/03/2025    ALBUMIN 4.4 01/03/2025    BILITOT 0.8 01/03/2025    AST 23 01/03/2025    ALKPHOS 69 01/03/2025    CO2 31 (H) 01/03/2025    ALT 35 01/03/2025    ANIONGAP 6 (L) 01/03/2025       TSH  No results found for:  ""TSH"    Imaging:  X-ray Knee Ortho Bilateral with Flexion  Narrative: EXAMINATION:  XR KNEE ORTHO BILAT WITH FLEXION    CLINICAL HISTORY:  Pain in right knee    TECHNIQUE:  AP standing of both knees, PA flexion standing views of both knees, and Merchant views of both knees were performed.  Lateral views of both knees were also performed.    COMPARISON:  04/11/2022    FINDINGS:  Right knee: There is a probable small persistent joint effusion present which appears somewhat improved from prior.  No acute fractures or dislocations visualized.  Small marginal osteophytes are present in the patellofemoral and medial compartments with otherwise preserved joint spaces.    Left knee:  There is no radiographic evidence of acute osseous, articular, or soft tissue abnormality. Small tricompartmental marginal osteophytes are present with slight joint space narrowing in the medial compartment.  Impression: As above    Electronically signed by: Randy Mccord MD  Date:    04/26/2022  Time:    11:01      Assessment/Plan:  Assessment & Plan    Reviewed cholesterol, renal function, and blood counts, noting all are WNL.  Opted for short-term use due to its stimulant properties and potential cardiac effects. Plan to monitor vital signs and weight loss progress to determine ongoing efficacy and safety.    HYPERTENSION:  - Monitored the patient who is on hydrochlorothiazide for hypertension management.  - Evaluated blood pressure and advised to come in for a BP check when running low on Phentermine pills (approximately 5 pills remaining).  - Discussed potential side effects of Phentermine including increased heart rate and elevated BP.  - Advised to discontinue Phentermine if blood pressure increases significantly.    HYPERLIPIDEMIA:  - Monitored the patient's cholesterol levels which are currently within normal range.  - Continued atorvastatin for hyperlipidemia management.    OBESITY:  - Prescribed Phentermine for weight loss, to be " taken daily in the morning.  - Explained mechanism as an appetite suppressant.  - Instructed the patient to continue medication only while actively losing weight and discontinue when weight loss plateaus.  - Discussed potential side effects including headache and dry mouth.  - Advised to come in for weight check when running low on Phentermine pills.    BENIGN PROSTATIC HYPERPLASIA:  - Evaluated the patient who reports no problems urinating.  - Continued Tamsulosin for benign prostatic hyperplasia management.    FOLLOW-UP:  - Recommend follow up annually for bloodwork and medication refills.  - Will send a reminder for the next in-person visit.         1. Essential hypertension  -     losartan-hydrochlorothiazide 100-25 mg (HYZAAR) 100-25 mg per tablet; Take 1 tablet by mouth once daily. For blood pressure  Dispense: 90 tablet; Refill: 3    2. Mixed hyperlipidemia  -     atorvastatin (LIPITOR) 10 MG tablet; Take 1 tablet (10 mg total) by mouth once daily.  Dispense: 90 tablet; Refill: 3    3. Hyperlipidemia, unspecified hyperlipidemia type  -     atorvastatin (LIPITOR) 10 MG tablet; Take 1 tablet (10 mg total) by mouth once daily.  Dispense: 90 tablet; Refill: 3    4. Benign prostatic hyperplasia with incomplete bladder emptying  -     tamsulosin (FLOMAX) 0.4 mg Cap; Take 1 capsule (0.4 mg total) by mouth once daily.  Dispense: 90 capsule; Refill: 3    5. Class 3 severe obesity due to excess calories with serious comorbidity and body mass index (BMI) of 40.0 to 44.9 in adult  -     phentermine (ADIPEX-P) 37.5 mg tablet; Take 1 tablet (37.5 mg total) by mouth before breakfast.  Dispense: 30 tablet; Refill: 0       Body mass index is 43.93 kg/m². Morbid obesity complicates all aspects of disease management from diagnostic modalities to treatment. Weight loss encouraged and health benefits explained to patient.        Discussed how to stay healthy including: diet, exercise, refraining from smoking and discussed  screening exams / tests needed for age, sex and family Hx.    RTC 1 year    RTC for nurse visit and vital check in 1 month    Fazal Forbes MD    This note was generated with the assistance of ambient listening technology. Verbal consent was obtained by the patient and accompanying visitor(s) for the recording of patient appointment to facilitate this note. I attest to having reviewed and edited the generated note for accuracy, though some syntax or spelling errors may persist. Please contact the author of this note for any clarification.         [1]   Current Outpatient Medications on File Prior to Visit   Medication Sig Dispense Refill    [DISCONTINUED] atorvastatin (LIPITOR) 10 MG tablet Take 1 tablet (10 mg total) by mouth once daily. 90 tablet 3    [DISCONTINUED] losartan-hydrochlorothiazide 100-25 mg (HYZAAR) 100-25 mg per tablet Take 1 tablet by mouth once daily. For blood pressure 90 tablet 3    [DISCONTINUED] tamsulosin (FLOMAX) 0.4 mg Cap Take 1 capsule (0.4 mg total) by mouth once daily. 90 capsule 3    [DISCONTINUED] tirzepatide, weight loss, (ZEPBOUND) 2.5 mg/0.5 mL PnIj Inject 2.5 mg into the skin every 7 days. 4 Pen 3     No current facility-administered medications on file prior to visit.